# Patient Record
Sex: MALE | Race: WHITE | Employment: FULL TIME | URBAN - METROPOLITAN AREA
[De-identification: names, ages, dates, MRNs, and addresses within clinical notes are randomized per-mention and may not be internally consistent; named-entity substitution may affect disease eponyms.]

---

## 2017-09-06 ENCOUNTER — OFFICE VISIT (OUTPATIENT)
Dept: PEDIATRICS | Facility: CLINIC | Age: 57
End: 2017-09-06
Payer: COMMERCIAL

## 2017-09-06 VITALS
BODY MASS INDEX: 34.02 KG/M2 | OXYGEN SATURATION: 96 % | HEART RATE: 64 BPM | HEIGHT: 71 IN | DIASTOLIC BLOOD PRESSURE: 78 MMHG | WEIGHT: 243 LBS | SYSTOLIC BLOOD PRESSURE: 118 MMHG | TEMPERATURE: 97.6 F

## 2017-09-06 DIAGNOSIS — Z11.59 NEED FOR HEPATITIS C SCREENING TEST: ICD-10-CM

## 2017-09-06 DIAGNOSIS — I82.409 RECURRENT DEEP VENOUS THROMBOSIS, UNSPECIFIED LATERALITY (H): Primary | ICD-10-CM

## 2017-09-06 DIAGNOSIS — E78.5 HYPERLIPIDEMIA LDL GOAL <130: ICD-10-CM

## 2017-09-06 DIAGNOSIS — Z93.2 STATUS POST ILEOSTOMY (H): ICD-10-CM

## 2017-09-06 LAB
ALBUMIN SERPL-MCNC: 4.3 G/DL (ref 3.4–5)
ALP SERPL-CCNC: 57 U/L (ref 40–150)
ALT SERPL W P-5'-P-CCNC: 57 U/L (ref 0–70)
ANION GAP SERPL CALCULATED.3IONS-SCNC: 7 MMOL/L (ref 3–14)
AST SERPL W P-5'-P-CCNC: 33 U/L (ref 0–45)
BILIRUB SERPL-MCNC: 0.4 MG/DL (ref 0.2–1.3)
BUN SERPL-MCNC: 16 MG/DL (ref 7–30)
CALCIUM SERPL-MCNC: 9.1 MG/DL (ref 8.5–10.1)
CHLORIDE SERPL-SCNC: 110 MMOL/L (ref 94–109)
CHOLEST SERPL-MCNC: 181 MG/DL
CO2 SERPL-SCNC: 24 MMOL/L (ref 20–32)
CREAT SERPL-MCNC: 1.18 MG/DL (ref 0.66–1.25)
GFR SERPL CREATININE-BSD FRML MDRD: 63 ML/MIN/1.7M2
GLUCOSE SERPL-MCNC: 98 MG/DL (ref 70–99)
HDLC SERPL-MCNC: 55 MG/DL
LDLC SERPL CALC-MCNC: 109 MG/DL
NONHDLC SERPL-MCNC: 126 MG/DL
POTASSIUM SERPL-SCNC: 4.2 MMOL/L (ref 3.4–5.3)
PROT SERPL-MCNC: 7.1 G/DL (ref 6.8–8.8)
SODIUM SERPL-SCNC: 141 MMOL/L (ref 133–144)
TRIGL SERPL-MCNC: 86 MG/DL

## 2017-09-06 PROCEDURE — 80061 LIPID PANEL: CPT | Performed by: INTERNAL MEDICINE

## 2017-09-06 PROCEDURE — 36415 COLL VENOUS BLD VENIPUNCTURE: CPT | Performed by: INTERNAL MEDICINE

## 2017-09-06 PROCEDURE — 86803 HEPATITIS C AB TEST: CPT | Performed by: INTERNAL MEDICINE

## 2017-09-06 PROCEDURE — 80053 COMPREHEN METABOLIC PANEL: CPT | Performed by: INTERNAL MEDICINE

## 2017-09-06 PROCEDURE — 99214 OFFICE O/P EST MOD 30 MIN: CPT | Performed by: INTERNAL MEDICINE

## 2017-09-06 RX ORDER — SIMVASTATIN 20 MG
20 TABLET ORAL DAILY
Qty: 90 TABLET | Refills: 3 | Status: SHIPPED | OUTPATIENT
Start: 2017-09-06 | End: 2018-11-15

## 2017-09-06 NOTE — PROGRESS NOTES
SUBJECTIVE:                                                    Anam Wolf is a 57 year old male who presents to clinic today for the following health issues:    57-year-old male with a history of recurrent DVT presents today for follow-up.  Since most recent visit, patient has met with hematology.  Recent hematology notes reviewed: history of 3 DVTs in the past and has been on long-term anticoagulation.  today patient is interested in discontinuing anticoagulation as suggested by Hematology.      Hyperlipidemia.  Tolerating statin without side effects.  Recent Labs   Lab Test  09/06/17   0928  11/22/16   0953  11/16/15   1006  08/06/14   1023   CHOL  181  184  192  168   HDL  55  47  49  48   LDL  109*  103*  118  91   TRIG  86  171*  124  144   CHOLHDLRATIO   --    --   3.9  3.5     History of ileostomy following colectomy for ulcerative colitis.  Has had no issues with the stoma    Patient Active Problem List   Diagnosis     HYPERLIPIDEMIA LDL GOAL <130     Status post ileostomy (H)     Atopic dermatitis, unspecified type     Recurrent deep venous thrombosis, unspecified laterality (H)     Past Surgical History:   Procedure Laterality Date     C REMOVAL COLON/PROCTECTOMY/ILEOSTOMY  4/18/2006    Total proctocolectomy with intersphincteric proctectomy and end ileostomy     HC KNEE SCOPE,MED/LAT MENISECTOMY  12/10/2004    Right knee, partial medial meniscectomy and chondroplasty medial femoral condyle     HC REMOVAL OF TONSILS,<11 Y/O       HC REPAIR ING HERNIA,5+Y/O,REDUCIBL         Social History   Substance Use Topics     Smoking status: Never Smoker     Smokeless tobacco: Never Used     Alcohol use 0.0 oz/week     0 Standard drinks or equivalent per week      Comment: rare     Family History   Problem Relation Age of Onset     Lipids Father      DIABETES Brother      type 2     Lipids Brother      C.A.D. Father      MI related to surgery     Cancer - colorectal Father      diagnosis age 56     Prostate  "Cancer No family hx of      Blood Disease No family hx of      denies family history of venous thromboembolic dz         Current Outpatient Prescriptions   Medication Sig Dispense Refill     simvastatin (ZOCOR) 20 MG tablet Take 1 tablet (20 mg) by mouth daily 90 tablet 3     triamcinolone (KENALOG) 0.1 % cream APPLY TO RASH TID PRN 60 g 3     [DISCONTINUED] simvastatin (ZOCOR) 20 MG tablet Take 1 tablet (20 mg) by mouth daily 90 tablet 3       ROS: The following systems have been completely reviewed and are negative except as noted in the HPI: CONSTITUTIONAL, GASTROINTESTINAL, PULMONARY    OBJECTIVE:                                                    /78 (Cuff Size: Adult Large)  Pulse 64  Temp 97.6  F (36.4  C) (Oral)  Ht 5' 11\" (1.803 m)  Wt 243 lb (110.2 kg)  SpO2 96%  BMI 33.89 kg/m2 Body mass index is 33.89 kg/(m^2).  GENERAL:  alert,  no distress  NECK: no tenderness, no adenopathy  RESP: lungs clear to auscultation - no rales, no rhonchi, no wheezes  CV: regular rates and rhythm, normal S1 S2  Abd: soft, NT  MS: extremities- no edema       ASSESSMENT/PLAN:                                                        ICD-10-CM    1. Recurrent deep venous thrombosis, unspecified laterality (H) I82.409 Plan for trial off anticoagulation as per hematology.  Hematology also recommends ASA 81mg long-term.      Signs and symptoms of DVT and PE reviewed.  Patient understands that he will require lifelong anticoagulation if any further clots develop     2. Hyperlipidemia LDL goal <130 E78.5 Lipid panel reflex to direct LDL     Comprehensive metabolic panel (BMP + Alb, Alk Phos, ALT, AST, Total. Bili, TP)     simvastatin (ZOCOR) 20 MG tablet       Well controlled.  Fasting for labwork     3. Status post ileostomy (H) Z93.2 No problems with ostomy or output     4. Need for hepatitis C screening test Z11.59 Hepatitis C antibody      Villa Garland MD  [facility] [place]     "

## 2017-09-06 NOTE — MR AVS SNAPSHOT
After Visit Summary   9/6/2017    Anam Wolf    MRN: [id number]           Patient Information     Date Of Birth          1960        Visit Information        Provider Department      9/6/2017 9:00 AM Villa Garland MD [facility]        Today's Diagnoses     Recurrent deep venous thrombosis, unspecified laterality (H)    -  1    Hyperlipidemia LDL goal <130        Status post ileostomy (H)        Need for hepatitis C screening test          Care Instructions    INSTRUCTIONS FOR TODAY:     stop Xarelto, start aspirin 81mg daily tomorrow     Dr Garland            Follow-ups after your visit        Who to contact     If you have questions or need follow up information about today's clinic visit or your schedule please contact [facility] directly at [phone].  Normal or non-critical lab and imaging results will be communicated to you by [employer], letter or phone within 4 business days after the clinic has received the results. If you do not hear from us within 7 days, please contact the clinic through [employer] or phone. If you have a critical or abnormal lab result, we will notify you by phone as soon as possible.  Submit refill requests through [employer] or call your pharmacy and they will forward the refill request to us. Please allow 3 business days for your refill to be completed.          Additional Information About Your Visit        MyChart Information     [employer] gives you secure access to your electronic health record. If you see a primary care provider, you can also send messages to your care team and make appointments. If you have questions, please call your primary care clinic.  If you do not have a primary care provider, please call [phone] and they will assist you.        Care EveryWhere ID     This is your Care EveryWhere ID. This could be used by other organizations to access your [place] medical records  [id number]        Your Vitals Were      "Pulse Temperature Height Pulse Oximetry BMI (Body Mass Index)       64 97.6  F (36.4  C) (Oral) 5' 11\" (1.803 m) 96% 33.89 kg/m2        Blood Pressure from Last 3 Encounters:   09/06/17 118/78   12/13/16 140/78   11/22/16 128/78    Weight from Last 3 Encounters:   09/06/17 243 lb (110.2 kg)   12/13/16 242 lb (109.8 kg)   11/22/16 241 lb (109.3 kg)              We Performed the Following     Comprehensive metabolic panel (BMP + Alb, Alk Phos, ALT, AST, Total. Bili, TP)     Hepatitis C antibody     Lipid panel reflex to direct LDL          Today's Medication Changes          These changes are accurate as of: 9/6/17  9:25 AM.  If you have any questions, ask your nurse or doctor.               Stop taking these medicines if you haven't already. Please contact your care team if you have questions.     rivaroxaban ANTICOAGULANT 20 MG Tabs tablet   Commonly known as:  XARELTO   Stopped by:  Villa Garland MD                Where to get your medicines      [id number]  [place] - [address] AT [facility]  [address] [address]     Phone:  [phone]     simvastatin 20 MG tablet                Primary Care Provider Office Phone # Fax #    Villa Garland [phone] [address] [place] [address]        Equal Access to Services     [facility] AH: Hadii marta ku hadasho Soomaali, waaxda luqadaha, qaybta kaalmada almaegyacoretta, waxay tacso zuniga . So [facility] [phone].    ATENCIÓN: Si habla octavio, tiene a morin disposición servicios gratuitos de asistencia lingüística. Llame al [phone].    We comply with applicable federal civil rights laws and [place] laws. We do not discriminate on the basis of race, color, national origin, age, disability sex, sexual orientation or gender identity.            Thank you!     Thank you for choosing [facility] [place]  for your care. Our goal " is always to provide you with excellent care. Hearing back from our patients is one way we can continue to improve our services. Please take a few minutes to complete the written survey that you may receive in the mail after your visit with us. Thank you!             Your Updated Medication List - Protect others around you: Learn how to safely use, store and throw away your medicines at www.disposemymeds.org.          This list is accurate as of: 9/6/17  9:25 AM.  Always use your most recent med list.                   Brand Name Dispense Instructions for use Diagnosis    simvastatin 20 MG tablet    ZOCOR    90 tablet    Take 1 tablet (20 mg) by mouth daily    Hyperlipidemia LDL goal <130       triamcinolone 0.1 % cream    KENALOG    60 g    APPLY TO RASH TID PRN    Atopic dermatitis, unspecified type

## 2017-09-06 NOTE — NURSING NOTE
"Chief Complaint   Patient presents with     Recheck Medication       Initial /78 (Cuff Size: Adult Large)  Pulse 64  Temp 97.6  F (36.4  C) (Oral)  Ht 5' 11\" (1.803 m)  Wt 243 lb (110.2 kg)  SpO2 96%  BMI 33.89 kg/m2 Estimated body mass index is 33.89 kg/(m^2) as calculated from the following:    Height as of this encounter: 5' 11\" (1.803 m).    Weight as of this encounter: 243 lb (110.2 kg).  Medication Reconciliation: jennifer Mancuso, LIN    "

## 2017-09-07 LAB — HCV AB SERPL QL IA: NONREACTIVE

## 2018-02-14 ENCOUNTER — OFFICE VISIT (OUTPATIENT)
Dept: PEDIATRICS | Facility: CLINIC | Age: 58
End: 2018-02-14
Payer: COMMERCIAL

## 2018-02-14 VITALS
SYSTOLIC BLOOD PRESSURE: 146 MMHG | DIASTOLIC BLOOD PRESSURE: 82 MMHG | OXYGEN SATURATION: 96 % | BODY MASS INDEX: 34.69 KG/M2 | TEMPERATURE: 98.5 F | HEART RATE: 91 BPM | WEIGHT: 247.8 LBS | HEIGHT: 71 IN

## 2018-02-14 DIAGNOSIS — J20.9 ACUTE BRONCHITIS, UNSPECIFIED ORGANISM: Primary | ICD-10-CM

## 2018-02-14 DIAGNOSIS — J11.1 INFLUENZA: ICD-10-CM

## 2018-02-14 DIAGNOSIS — R06.2 WHEEZING: ICD-10-CM

## 2018-02-14 PROCEDURE — 94640 AIRWAY INHALATION TREATMENT: CPT | Performed by: PHYSICIAN ASSISTANT

## 2018-02-14 PROCEDURE — 99214 OFFICE O/P EST MOD 30 MIN: CPT | Mod: 25 | Performed by: PHYSICIAN ASSISTANT

## 2018-02-14 RX ORDER — ALBUTEROL SULFATE 90 UG/1
2 AEROSOL, METERED RESPIRATORY (INHALATION) EVERY 6 HOURS PRN
Qty: 1 INHALER | Refills: 0 | Status: SHIPPED | OUTPATIENT
Start: 2018-02-14 | End: 2018-12-11

## 2018-02-14 RX ORDER — CODEINE PHOSPHATE AND GUAIFENESIN 10; 100 MG/5ML; MG/5ML
1 SOLUTION ORAL
Qty: 60 ML | Refills: 0 | Status: SHIPPED | OUTPATIENT
Start: 2018-02-14 | End: 2018-12-11

## 2018-02-14 RX ORDER — AZITHROMYCIN 250 MG/1
TABLET, FILM COATED ORAL
Qty: 6 TABLET | Refills: 0 | Status: SHIPPED | OUTPATIENT
Start: 2018-02-14 | End: 2018-12-11

## 2018-02-14 NOTE — MR AVS SNAPSHOT
After Visit Summary   2/14/2018    Anam Wolf    MRN: [id number]           Patient Information     Date Of Birth          1960        Visit Information        Provider Department      2/14/2018 12:50 PM Errol Mckeon PA-C [facility]        Today's Diagnoses     Acute bronchitis, unspecified organism    -  1      Care Instructions                       Acute Bronchitis                  What is acute bronchitis?   Bronchitis is an infection of the air passages--that is, the tubes that connect the windpipe to the lungs. It causes swelling and irritation of the airways. With acute bronchitis you usually have a cough that produces phlegm and pain behind the breastbone when you breathe deeply or cough.   How does it occur?   Bronchitis often occurs with viral infections of the respiratory tract, such as colds and flu. Bronchitis may also be caused by bacterial infections. It may occur with childhood illnesses such as measles and whooping cough.   Attacks are most frequent during the winter or when the level of air pollution is high.   Infants, young children, older adults, smokers, and people with heart disease or lung disease (including asthma and allergies) are most likely to get acute bronchitis.   What are the symptoms?   Symptoms may include:   a deep cough that produces yellowish or greenish phlegm   pain behind the breastbone when you breathe deeply or cough   wheezing   feeling short of breath   fever   chills   headache   sore muscles.   How is it diagnosed?   Your healthcare provider will ask about your symptoms and examine you. You may have tests, such as:   a test of phlegm to look for bacteria   chest X-ray   blood tests.   How is it treated?   Acute bronchitis often does not require medical treatment. Resting at home and drinking plenty of fluids to keep the mucus loose may be all you need to do to get better in a few days. If your symptoms are severe or you  have other health problems (such as heart or lung disease or diabetes), you may need to take antibiotics.   How long will the effects last?   Most of the time acute bronchitis clears up in a few days. Your cough may slowly get better in 1 to 2 weeks.   It may take you longer to recover if:   You are a smoker.   You live in an area where air pollution is a problem.   You have a heart or lung disease.   You have any other continuing health problems.   How can I take care of myself?   You can help yourself by:   following the full treatment your healthcare provider recommends   using a vaporizer, humidifier, or steam from hot water to add moisture to the air   drinking plenty of liquids   taking cough medicine if recommended by your healthcare provider   resting in bed   taking aspirin or acetaminophen to reduce fever and relieve headache and muscle pain (Check with your healthcare provider before you give any medicine that contains aspirin or salicylates to a child or teen. This includes medicines like baby aspirin, some cold medicines, and Pepto Bismol. Children and teens who take aspirin are at risk for a serious illness called Reye's syndrome.)   eating healthy meals.   Call your healthcare provider if:   You have trouble breathing.   You have a fever of 101.5?F (38.6?C) or higher.   You cough up blood.   Your symptoms are getting worse instead of better.   You don't start to feel better after 3 days of treatment.   You have any symptoms that concern you.   How can I help prevent acute bronchitis?   To reduce your risk of getting a respiratory infection:   Do not smoke.   Wash your hands often, especially when you are around people with colds (upper respiratory infections).   If you have asthma or allergies, keep your symptoms under good control.   Get regular exercise.   Eat healthy foods.       Published by [employer].  This content is reviewed periodically and is subject to change as new health information  "becomes available. The information is intended to inform and educate and is not a replacement for medical evaluation, advice, diagnosis or treatment by a healthcare professional.   Developed by [employer].   ? 2010 [employer] and/or its affiliates. All Rights Reserved.   Copyright   [employer] 2011                  Follow-ups after your visit        Who to contact     If you have questions or need follow up information about today's clinic visit or your schedule please contact [facility] directly at [phone].  Normal or non-critical lab and imaging results will be communicated to you by [employer], letter or phone within 4 business days after the clinic has received the results. If you do not hear from us within 7 days, please contact the clinic through [employer] or phone. If you have a critical or abnormal lab result, we will notify you by phone as soon as possible.  Submit refill requests through [employer] or call your pharmacy and they will forward the refill request to us. Please allow 3 business days for your refill to be completed.          Additional Information About Your Visit        [employer] Information     [employer] gives you secure access to your electronic health record. If you see a primary care provider, you can also send messages to your care team and make appointments. If you have questions, please call your primary care clinic.  If you do not have a primary care provider, please call [phone] and they will assist you.        Care EveryWhere ID     This is your Care EveryWhere ID. This could be used by other organizations to access your [place] medical records  [id number]        Your Vitals Were     Pulse Temperature Height Pulse Oximetry BMI (Body Mass Index)       91 98.5  F (36.9  C) (Oral) 5' 11\" (1.803 m) 96% 34.56 kg/m2        Blood Pressure from Last 3 Encounters:   02/14/18 146/82   09/06/17 118/78   12/13/16 140/78    Weight from Last 3 Encounters:   02/14/18 " 247 lb 12.8 oz (112.4 kg)   09/06/17 243 lb (110.2 kg)   12/13/16 242 lb (109.8 kg)              Today, you had the following     No orders found for display         Today's Medication Changes          These changes are accurate as of 2/14/18  1:33 PM.  If you have any questions, ask your nurse or doctor.               Start taking these medicines.        Dose/Directions    albuterol 108 (90 BASE) MCG/ACT Inhaler   Commonly known as:  PROAIR HFA/PROVENTIL HFA/VENTOLIN HFA   Used for:  Acute bronchitis, unspecified organism   Started by:  Errol Mckeon PA-C        Dose:  2 puff   Inhale 2 puffs into the lungs every 6 hours as needed for shortness of breath / dyspnea or wheezing   Quantity:  1 Inhaler   Refills:  0       azithromycin 250 MG tablet   Commonly known as:  ZITHROMAX   Used for:  Acute bronchitis, unspecified organism   Started by:  Errol Mckeon PA-C        Two tablets first day, then one tablet daily for four days.   Quantity:  6 tablet   Refills:  0       guaiFENesin-codeine 100-10 MG/5ML Soln solution   Commonly known as:  ROBITUSSIN AC   Used for:  Acute bronchitis, unspecified organism   Started by:  Errol Mckeon PA-C        Dose:  1 tsp.   Take 5 mLs by mouth nightly as needed for cough   Quantity:  60 mL   Refills:  0            Where to get your medicines      These medications were sent to [id number]  [place] - [address] AT [facility]  [address] [place] [address]     Phone:  [phone]     albuterol 108 (90 BASE) MCG/ACT Inhaler    azithromycin 250 MG tablet         Some of these will need a paper prescription and others can be bought over the counter.  Ask your nurse if you have questions.     Bring a paper prescription for each of these medications     guaiFENesin-codeine 100-10 MG/5ML Soln solution                Primary Care Provider Office Phone # Fax #    Villa Garland [phone]        [address] [place] [address]        Equal Access to Services     [facility] : Hadii marta dueñas antoniadana Deangeloali, wakurtisda rolandkamiha, qaybta kajose miguelcoretta guzman, eamon meadowsalejoyefri zuniga . So [facility] [phone].    ATENCIÓN: Si habla español, tiene a morin disposición servicios gratuitos de asistencia lingüística. LlFairfield Medical Center [phone].    We comply with applicable federal civil rights laws and [place] laws. We do not discriminate on the basis of race, color, national origin, age, disability, sex, sexual orientation, or gender identity.            Thank you!     Thank you for choosing [facility] [place]  for your care. Our goal is always to provide you with excellent care. Hearing back from our patients is one way we can continue to improve our services. Please take a few minutes to complete the written survey that you may receive in the mail after your visit with us. Thank you!             Your Updated Medication List - Protect others around you: Learn how to safely use, store and throw away your medicines at www.disposemymeds.org.          This list is accurate as of 2/14/18  1:33 PM.  Always use your most recent med list.                   Brand Name Dispense Instructions for use Diagnosis    albuterol 108 (90 BASE) MCG/ACT Inhaler    PROAIR HFA/PROVENTIL HFA/VENTOLIN HFA    1 Inhaler    Inhale 2 puffs into the lungs every 6 hours as needed for shortness of breath / dyspnea or wheezing    Acute bronchitis, unspecified organism       azithromycin 250 MG tablet    ZITHROMAX    6 tablet    Two tablets first day, then one tablet daily for four days.    Acute bronchitis, unspecified organism       guaiFENesin-codeine 100-10 MG/5ML Soln solution    ROBITUSSIN AC    60 mL    Take 5 mLs by mouth nightly as needed for cough    Acute bronchitis, unspecified organism       simvastatin 20 MG tablet    ZOCOR    90 tablet    Take 1 tablet (20 mg) by mouth daily    Hyperlipidemia LDL goal <130        triamcinolone 0.1 % cream    KENALOG    60 g    APPLY TO RASH TID PRN    Atopic dermatitis, unspecified type

## 2018-02-14 NOTE — PROGRESS NOTES
"  SUBJECTIVE:   Anam Wolf is a 57 year old male who presents to clinic today for the following health issues:    Acute Illness   Acute illness concerns: cough  Onset: 6 days    Fever: YES--subjective    Chills/Sweats: YES- both    Headache (location?): YES    Sinus Pressure:no    Conjunctivitis:  no    Ear Pain: no    Rhinorrhea: YES- clear/green    Congestion: YES- nasal and chest    Sore Throat: YES     Cough: YES-productive of clear sputum, productive of green sputum    Wheeze: YES    Sob: no    Chest tightness: yes    Decreased Appetite: YES    Nausea: no    Vomiting: no    Diarrhea:  no    Dysuria/Freq.: no    Fatigue/Achiness: YES- both    Sick/Strep Exposure: no     Therapies Tried and outcome: Tylenol, Ibuprofen   History of \"asthma\" issues. History of bronchitis.   Nonsmoker.   Work: office setting  No flu shot  Saturday: leaving for [employer]    ROS:  ROS otherwise negative    OBJECTIVE:                                                    /82 (BP Location: Right arm, Cuff Size: Adult Large)  Pulse 91  Temp 98.5  F (36.9  C) (Oral)  Ht 5' 11\" (1.803 m)  Wt 247 lb 12.8 oz (112.4 kg)  SpO2 96%  BMI 34.56 kg/m2  Body mass index is 34.56 kg/(m^2).   GENERAL: alert, no distress  HENT: ear canals- normal; TMs- normal; Nose- normal; Mouth- no ulcers, no lesions  NECK: no tenderness, no adenopathy  RESP: diminished breath sounds throughout; rhonchi and wheezing present  CV: regular rates and rhythm, normal S1 S2, no S3 or S4 and no murmur, no click or rub    Diagnostic test results:  duoneb given with resolution of wheezing and rhonchi  No results found for this or any previous visit (from the past 24 hour(s)).     ASSESSMENT/PLAN:                                                    (J20.9) Acute bronchitis, unspecified organism  (primary encounter diagnosis)  Comment: begin antibiotics and albuterol four times daily.   Plan: azithromycin (ZITHROMAX) 250 MG tablet,         albuterol (PROAIR " HFA/PROVENTIL HFA/VENTOLIN         HFA) 108 (90 BASE) MCG/ACT Inhaler,         guaiFENesin-codeine (ROBITUSSIN AC) 100-10         MG/5ML SOLN solution            (R06.2) Wheezing  Comment:   Plan: ALBUTEROL/IPRATROPIUM 3ML NEB,         INHALATION/NEBULIZER TREATMENT, INITIAL           (J11.1) Influenza  Comment: continue with symptomatic treatment.   Plan:       See Patient Instructions    Errol Mckeon PA-C  [facility]

## 2018-02-14 NOTE — PATIENT INSTRUCTIONS
Acute Bronchitis                  What is acute bronchitis?   Bronchitis is an infection of the air passages--that is, the tubes that connect the windpipe to the lungs. It causes swelling and irritation of the airways. With acute bronchitis you usually have a cough that produces phlegm and pain behind the breastbone when you breathe deeply or cough.   How does it occur?   Bronchitis often occurs with viral infections of the respiratory tract, such as colds and flu. Bronchitis may also be caused by bacterial infections. It may occur with childhood illnesses such as measles and whooping cough.   Attacks are most frequent during the winter or when the level of air pollution is high.   Infants, young children, older adults, smokers, and people with heart disease or lung disease (including asthma and allergies) are most likely to get acute bronchitis.   What are the symptoms?   Symptoms may include:   a deep cough that produces yellowish or greenish phlegm   pain behind the breastbone when you breathe deeply or cough   wheezing   feeling short of breath   fever   chills   headache   sore muscles.   How is it diagnosed?   Your healthcare provider will ask about your symptoms and examine you. You may have tests, such as:   a test of phlegm to look for bacteria   chest X-ray   blood tests.   How is it treated?   Acute bronchitis often does not require medical treatment. Resting at home and drinking plenty of fluids to keep the mucus loose may be all you need to do to get better in a few days. If your symptoms are severe or you have other health problems (such as heart or lung disease or diabetes), you may need to take antibiotics.   How long will the effects last?   Most of the time acute bronchitis clears up in a few days. Your cough may slowly get better in 1 to 2 weeks.   It may take you longer to recover if:   You are a smoker.   You live in an area where air pollution is a problem.   You have a heart  or lung disease.   You have any other continuing health problems.   How can I take care of myself?   You can help yourself by:   following the full treatment your healthcare provider recommends   using a vaporizer, humidifier, or steam from hot water to add moisture to the air   drinking plenty of liquids   taking cough medicine if recommended by your healthcare provider   resting in bed   taking aspirin or acetaminophen to reduce fever and relieve headache and muscle pain (Check with your healthcare provider before you give any medicine that contains aspirin or salicylates to a child or teen. This includes medicines like baby aspirin, some cold medicines, and Pepto Bismol. Children and teens who take aspirin are at risk for a serious illness called Reye's syndrome.)   eating healthy meals.   Call your healthcare provider if:   You have trouble breathing.   You have a fever of 101.5?F (38.6?C) or higher.   You cough up blood.   Your symptoms are getting worse instead of better.   You don't start to feel better after 3 days of treatment.   You have any symptoms that concern you.   How can I help prevent acute bronchitis?   To reduce your risk of getting a respiratory infection:   Do not smoke.   Wash your hands often, especially when you are around people with colds (upper respiratory infections).   If you have asthma or allergies, keep your symptoms under good control.   Get regular exercise.   Eat healthy foods.       Published by [employer].  This content is reviewed periodically and is subject to change as new health information becomes available. The information is intended to inform and educate and is not a replacement for medical evaluation, advice, diagnosis or treatment by a healthcare professional.   Developed by [employer].   ? 2010 [employer] and/or its affiliates. All Rights Reserved.   Copyright   [employer] 2011

## 2018-02-14 NOTE — NURSING NOTE
"Chief Complaint   Patient presents with     Cough       Initial /82 (BP Location: Right arm, Cuff Size: Adult Large)  Pulse 91  Temp 98.5  F (36.9  C) (Oral)  Ht 5' 11\" (1.803 m)  Wt 247 lb 12.8 oz (112.4 kg)  SpO2 96%  BMI 34.56 kg/m2 Estimated body mass index is 34.56 kg/(m^2) as calculated from the following:    Height as of this encounter: 5' 11\" (1.803 m).    Weight as of this encounter: 247 lb 12.8 oz (112.4 kg).  Medication Reconciliation: complete   Don Purvis CMA      "

## 2018-11-15 DIAGNOSIS — E78.5 HYPERLIPIDEMIA LDL GOAL <130: ICD-10-CM

## 2018-11-19 RX ORDER — SIMVASTATIN 20 MG
20 TABLET ORAL DAILY
Qty: 30 TABLET | Refills: 0 | Status: SHIPPED | OUTPATIENT
Start: 2018-11-19 | End: 2018-12-11

## 2018-11-19 NOTE — TELEPHONE ENCOUNTER
Medication is being filled for 1 time refill only due to:  Pt over due for office visit.     Tati Murray RN

## 2018-12-11 ENCOUNTER — OFFICE VISIT (OUTPATIENT)
Dept: PEDIATRICS | Facility: CLINIC | Age: 58
End: 2018-12-11
Payer: COMMERCIAL

## 2018-12-11 VITALS
HEART RATE: 62 BPM | BODY MASS INDEX: 33.19 KG/M2 | WEIGHT: 238 LBS | SYSTOLIC BLOOD PRESSURE: 122 MMHG | TEMPERATURE: 97.8 F | DIASTOLIC BLOOD PRESSURE: 80 MMHG

## 2018-12-11 DIAGNOSIS — E78.5 HYPERLIPIDEMIA LDL GOAL <130: ICD-10-CM

## 2018-12-11 DIAGNOSIS — Z12.5 SCREENING FOR PROSTATE CANCER: ICD-10-CM

## 2018-12-11 DIAGNOSIS — Z00.00 ENCOUNTER FOR ROUTINE ADULT HEALTH EXAMINATION WITHOUT ABNORMAL FINDINGS: Primary | ICD-10-CM

## 2018-12-11 DIAGNOSIS — Z93.2 STATUS POST ILEOSTOMY (H): ICD-10-CM

## 2018-12-11 LAB
ALBUMIN SERPL-MCNC: 4.2 G/DL (ref 3.4–5)
ALP SERPL-CCNC: 52 U/L (ref 40–150)
ALT SERPL W P-5'-P-CCNC: 52 U/L (ref 0–70)
ANION GAP SERPL CALCULATED.3IONS-SCNC: 8 MMOL/L (ref 3–14)
AST SERPL W P-5'-P-CCNC: 36 U/L (ref 0–45)
BILIRUB SERPL-MCNC: 0.8 MG/DL (ref 0.2–1.3)
BUN SERPL-MCNC: 12 MG/DL (ref 7–30)
CALCIUM SERPL-MCNC: 9.4 MG/DL (ref 8.5–10.1)
CHLORIDE SERPL-SCNC: 108 MMOL/L (ref 94–109)
CHOLEST SERPL-MCNC: 147 MG/DL
CO2 SERPL-SCNC: 25 MMOL/L (ref 20–32)
CREAT SERPL-MCNC: 1.23 MG/DL (ref 0.66–1.25)
GFR SERPL CREATININE-BSD FRML MDRD: 60 ML/MIN/1.7M2
GLUCOSE SERPL-MCNC: 97 MG/DL (ref 70–99)
HDLC SERPL-MCNC: 44 MG/DL
LDLC SERPL CALC-MCNC: 82 MG/DL
NONHDLC SERPL-MCNC: 103 MG/DL
POTASSIUM SERPL-SCNC: 4.1 MMOL/L (ref 3.4–5.3)
PROT SERPL-MCNC: 7 G/DL (ref 6.8–8.8)
PSA SERPL-ACNC: 0.28 UG/L (ref 0–4)
SODIUM SERPL-SCNC: 141 MMOL/L (ref 133–144)
TRIGL SERPL-MCNC: 104 MG/DL

## 2018-12-11 PROCEDURE — 99396 PREV VISIT EST AGE 40-64: CPT | Performed by: INTERNAL MEDICINE

## 2018-12-11 PROCEDURE — 80061 LIPID PANEL: CPT | Performed by: INTERNAL MEDICINE

## 2018-12-11 PROCEDURE — 36415 COLL VENOUS BLD VENIPUNCTURE: CPT | Performed by: INTERNAL MEDICINE

## 2018-12-11 PROCEDURE — 80053 COMPREHEN METABOLIC PANEL: CPT | Performed by: INTERNAL MEDICINE

## 2018-12-11 PROCEDURE — G0103 PSA SCREENING: HCPCS | Performed by: INTERNAL MEDICINE

## 2018-12-11 RX ORDER — SIMVASTATIN 20 MG
20 TABLET ORAL DAILY
Qty: 90 TABLET | Refills: 3 | Status: SHIPPED | OUTPATIENT
Start: 2018-12-11 | End: 2019-12-06

## 2018-12-11 ASSESSMENT — ENCOUNTER SYMPTOMS
COUGH: 0
EYE PAIN: 0
DIARRHEA: 0
WEAKNESS: 0
DIZZINESS: 0
FEVER: 0
MYALGIAS: 0
ARTHRALGIAS: 0
HEMATURIA: 0
SHORTNESS OF BREATH: 0
HEMATOCHEZIA: 0
NERVOUS/ANXIOUS: 0
CONSTIPATION: 0
CHILLS: 0
SORE THROAT: 0
HEADACHES: 0
NAUSEA: 0
ABDOMINAL PAIN: 0
PARESTHESIAS: 0
HEARTBURN: 0
PALPITATIONS: 0
FREQUENCY: 0
JOINT SWELLING: 0
DYSURIA: 0

## 2018-12-11 NOTE — PROGRESS NOTES
SUBJECTIVE:   CC: Anam Wolf is an 58 year old male who presents for preventative health visit.     Physical   Annual:     Getting at least 3 servings of Calcium per day:  NO    Bi-annual eye exam:  NO    Dental care twice a year:  Yes    Sleep apnea or symptoms of sleep apnea:  None    Diet:  Regular (no restrictions)    Frequency of exercise:  2-3 days/week    Duration of exercise:  Less than 15 minutes    Taking medications regularly:  Yes    Medication side effects:  Not applicable    Additional concerns today:  No    PHQ-2 Total Score: 0      Today's PHQ-2 Score:   PHQ-2 ( 1999 [employer]) 12/11/2018   Q1: Little interest or pleasure in doing things 0   Q2: Feeling down, depressed or hopeless 0   PHQ-2 Score 0   Q1: Little interest or pleasure in doing things Not at all   Q2: Feeling down, depressed or hopeless Not at all   PHQ-2 Score 0       Abuse: Current or Past(Physical, Sexual or Emotional)- No  Do you feel safe in your environment? Yes    Social History     Tobacco Use     Smoking status: Never Smoker     Smokeless tobacco: Never Used   Substance Use Topics     Alcohol use: Yes     Alcohol/week: 0.0 oz     Comment: rare     Alcohol Use 12/11/2018   If you drink alcohol do you typically have greater than 3 drinks per day OR greater than 7 drinks per week? No   No flowsheet data found.    Last PSA:   PSA   Date Value Ref Range Status   11/22/2016 0.36 0 - 4 ug/L Final     Comment:     Assay Method:  Chemiluminescence using Siemens Vista analyzer       Reviewed orders with patient. Reviewed health maintenance and updated orders accordingly - Yes      Reviewed and updated as needed this visit by clinical staff  Tobacco  Allergies  Meds         Reviewed and updated as needed this visit by Provider          Patient Active Problem List   Diagnosis     HYPERLIPIDEMIA LDL GOAL <130     Status post ileostomy (H)     Atopic dermatitis, unspecified type     Recurrent deep venous thrombosis, unspecified  laterality (H)     Past Medical History:   Diagnosis Date     Allergic rhinitis, cause unspecified     ragweed     DVT (deep venous thrombosis) (H)      Iatrogenic pulmonary embolism and infarction (H)     right lower extrem DVT with right PE, followed surgery for right knee     Left sided ulcerative (chronic) colitis (H)     initial presentation 2002 with fairly distal dz; flare beginning late 2005, hospitalized 3/2006; CT at that time showing bowel-wall thickening extending from transverse colon distal; refractory to med management, required colectomy 4/2006     Mild intermittent asthma 3/11/2005    prior diagnosis of mod persistent; currently off controller without persistent symptoms       Past Surgical History:   Procedure Laterality Date     C REMOVAL COLON/PROCTECTOMY/ILEOSTOMY  4/18/2006    Total proctocolectomy with intersphincteric proctectomy and end ileostomy     HC KNEE SCOPE,MED/LAT MENISECTOMY  12/10/2004    Right knee, partial medial meniscectomy and chondroplasty medial femoral condyle     HC REMOVAL OF TONSILS,<13 Y/O       HC REPAIR ING HERNIA,5+Y/O,REDUCIBL         Family History   Problem Relation Age of Onset     Lipids Father      C.A.D. Father         MI related to surgery     Cancer - colorectal Father         diagnosis age 56     Diabetes Brother         type 2     Lipids Brother      Prostate Cancer No family hx of      Blood Disease No family hx of         denies family history of venous thromboembolic dz       ALLERGIES     Allergies   Allergen Reactions     Sulfa Drugs      rash, swelling       Review of Systems   Constitutional: Negative for chills and fever.   HENT: Negative for congestion, ear pain, hearing loss and sore throat.    Eyes: Negative for pain and visual disturbance.   Respiratory: Negative for cough and shortness of breath.    Cardiovascular: Negative for chest pain, palpitations and peripheral edema.   Gastrointestinal: Negative for abdominal pain, constipation,  diarrhea, heartburn, hematochezia and nausea.   Genitourinary: Negative for discharge, dysuria, frequency, genital sores, hematuria, impotence and urgency.   Musculoskeletal: Negative for arthralgias, joint swelling and myalgias.   Skin: Negative for rash.   Neurological: Negative for dizziness, weakness, headaches and paresthesias.   Psychiatric/Behavioral: Negative for mood changes. The patient is not nervous/anxious.          OBJECTIVE:   /80 (Cuff Size: Adult Large)   Pulse 62   Temp 97.8  F (36.6  C) (Oral)   Wt 108 kg (238 lb)   BMI 33.19 kg/m      Physical Exam  GENERAL: healthy, alert and no distress  EYES: Eyes grossly normal to inspection, PERRL and conjunctivae and sclerae normal  HENT: ear canals and TM's normal, nose and mouth without ulcers or lesions  NECK: no adenopathy, no asymmetry, masses, or scars and thyroid normal to palpation  RESP: lungs clear to auscultation - no rales, rhonchi or wheezes  CV: regular rate and rhythm, normal S1 S2, no S3 or S4, no murmur, click or rub, no peripheral edema and peripheral pulses strong  ABDOMEN: soft, nontender, no hepatosplenomegaly, no masses and bowel sounds normal.  RLQ ostomy  MS: no gross musculoskeletal defects noted, no edema  SKIN: no suspicious lesions or rashes  NEURO: Normal strength and tone, mentation intact and speech normal  PSYCH: mentation appears normal, affect normal/bright    ASSESSMENT/PLAN:       ICD-10-CM    1. Encounter for routine adult health examination without abnormal findings Z00.00        2. Hyperlipidemia LDL goal <130 E78.5 Lipid panel reflex to direct LDL Fasting     Comprehensive metabolic panel (BMP + Alb, Alk Phos, ALT, AST, Total. Bili, TP)     simvastatin (ZOCOR) 20 MG tablet     3. Status post ileostomy (H) Z93.2 Prior colectomy due to UC, no problems with ostomy     4. Screening for prostate cancer Z12.5 PSA, screen       COUNSELING:   Reviewed preventive health counseling, as reflected in patient  "instructions       Regular exercise       Healthy diet/nutrition       Prostate cancer screening    BP Readings from Last 1 Encounters:   12/11/18 122/80     Estimated body mass index is 33.19 kg/m  as calculated from the following:    Height as of 2/14/18: 1.803 m (5' 11\").    Weight as of this encounter: 108 kg (238 lb).      Weight management plan: Discussed healthy diet and exercise guidelines     reports that  has never smoked. he has never used smokeless tobacco.      Counseling Resources:  ATP IV Guidelines  Pooled Cohorts Equation Calculator  FRAX Risk Assessment  ICSI Preventive Guidelines  Dietary Guidelines for Americans, 2010  USDA's MyPlate  ASA Prophylaxis  Lung CA Screening    Villa Garland MD, MD  [facility]  "

## 2019-05-07 ENCOUNTER — TELEPHONE (OUTPATIENT)
Dept: PEDIATRICS | Facility: CLINIC | Age: 59
End: 2019-05-07

## 2019-05-07 ENCOUNTER — OFFICE VISIT (OUTPATIENT)
Dept: URGENT CARE | Facility: URGENT CARE | Age: 59
End: 2019-05-07
Payer: COMMERCIAL

## 2019-05-07 VITALS
DIASTOLIC BLOOD PRESSURE: 78 MMHG | TEMPERATURE: 97.3 F | SYSTOLIC BLOOD PRESSURE: 124 MMHG | BODY MASS INDEX: 32.78 KG/M2 | HEART RATE: 75 BPM | WEIGHT: 235 LBS | RESPIRATION RATE: 16 BRPM | OXYGEN SATURATION: 96 %

## 2019-05-07 DIAGNOSIS — R43.8 PRIMARY BITTER TASTE DISORDER: ICD-10-CM

## 2019-05-07 DIAGNOSIS — R43.8 METALLIC TASTE: Primary | ICD-10-CM

## 2019-05-07 PROCEDURE — 99214 OFFICE O/P EST MOD 30 MIN: CPT | Performed by: PHYSICIAN ASSISTANT

## 2019-05-07 RX ORDER — METHYLPREDNISOLONE 4 MG
TABLET, DOSE PACK ORAL
Qty: 21 TABLET | Refills: 0 | Status: SHIPPED | OUTPATIENT
Start: 2019-05-07 | End: 2019-10-16

## 2019-05-07 RX ORDER — NYSTATIN 100000/ML
500000 SUSPENSION, ORAL (FINAL DOSE FORM) ORAL 4 TIMES DAILY
Qty: 280 ML | Refills: 0 | Status: SHIPPED | OUTPATIENT
Start: 2019-05-07 | End: 2019-10-16

## 2019-05-07 NOTE — TELEPHONE ENCOUNTER
Discussed symptoms with patient and advised that he will need an appointment to determine appropriate treatment.   Patient is in the [place] area right now, and would like to be seen.   Advised him that Urgent care is open at this time, and he chooses to be seen in Urgent care.  Also advised patient that there are openings this week with a provider if he would like to choose this option.  No further questions.  Will call back if any other questions or concerns.  CEDRIC Castellano RN

## 2019-05-07 NOTE — TELEPHONE ENCOUNTER
Pt called wanting to talk with PCP's nurse requesting for a precription because he was having symptoms of sore throat and metallic taste in his mouth.  He said he thinks it's the same thing his daughter and daughter's  had and just wanted to get the same medication they were prescribed.  I informed him he will most likely need to be seen or a possible telephone visit before he would be able to get any medications.    Please call back to advise/schedule if needed   [phone]  Can leave a detailed message

## 2019-05-07 NOTE — PROGRESS NOTES
SUBJECTIVE:   Anam Wolf is a 58 year old male presenting with a chief complaint of having metallic taste.  Onset of symptoms was  1 week ago.  Course of illness is same.    Severity moderate  Current and Associated symptoms: bitter taste, metallic taste  Treatment measures tried include none.  Predisposing factors include other friends had same symptoms .    Past Medical History:   Diagnosis Date     Allergic rhinitis, cause unspecified     ragweed     DVT (deep venous thrombosis) (H)      Iatrogenic pulmonary embolism and infarction (H)     right lower extrem DVT with right PE, followed surgery for right knee     Left sided ulcerative (chronic) colitis (H)     initial presentation 2002 with fairly distal dz; flare beginning late 2005, hospitalized 3/2006; CT at that time showing bowel-wall thickening extending from transverse colon distal; refractory to med management, required colectomy 4/2006     Mild intermittent asthma 3/11/2005    prior diagnosis of mod persistent; currently off controller without persistent symptoms        Allergies   Allergen Reactions     Sulfa Drugs      rash, swelling     Family History   Problem Relation Age of Onset     Lipids Father      C.A.D. Father         MI related to surgery     Cancer - colorectal Father         diagnosis age 56     Diabetes Brother         type 2     Lipids Brother      Prostate Cancer No family hx of      Blood Disease No family hx of         denies family history of venous thromboembolic dz         Social History     Tobacco Use     Smoking status: Never Smoker     Smokeless tobacco: Never Used   Substance Use Topics     Alcohol use: Yes     Alcohol/week: 0.0 oz     Comment: rare       ROS:  CONSTITUTIONAL:NEGATIVE for fever, chills, change in weight  INTEGUMENTARY/SKIN: NEGATIVE for worrisome rashes, moles or lesions  EYES: NEGATIVE for vision changes or irritation  ENT/MOUTH: POSITIVE for bitter taste in mouth  RESP:NEGATIVE for significant cough or  SOB  CV: NEGATIVE for chest pain, palpitations or peripheral edema  MUSCULOSKELETAL: NEGATIVE for significant arthralgias or myalgia  NEURO: NEGATIVE for weakness, dizziness or paresthesias    OBJECTIVE  :/78 (BP Location: Right arm, Patient Position: Sitting, Cuff Size: Adult Large)   Pulse 75   Temp 97.3  F (36.3  C)   Resp 16   Wt 106.6 kg (235 lb)   SpO2 96%   BMI 32.78 kg/m    GENERAL APPEARANCE: healthy, alert and no distress  HENT: ear canals and TM's normal.  Nose and mouth without ulcers, erythema or lesions  NECK: supple, nontender, no lymphadenopathy  RESP: lungs clear to auscultation - no rales, rhonchi or wheezes  CV: regular rates and rhythm, normal S1 S2, no murmur noted  NEURO: Normal strength and tone, sensory exam grossly normal,  normal speech and mentation  SKIN: no suspicious lesions or rashes    ASSESSMENT/PLAN    ICD-10-CM    1. Metallic taste R43.8 nystatin (MYCOSTATIN) 864628 UNIT/ML suspension     methylPREDNISolone (MEDROL DOSEPAK) 4 MG tablet therapy pack   2. Primary bitter taste disorder R43.8 nystatin (MYCOSTATIN) 889325 UNIT/ML suspension     methylPREDNISolone (MEDROL DOSEPAK) 4 MG tablet therapy pack         Orders Placed This Encounter     nystatin (MYCOSTATIN) 422664 UNIT/ML suspension     methylPREDNISolone (MEDROL DOSEPAK) 4 MG tablet therapy pack       Due to atypical symptoms of bitter taste will try course of nystatin  He had a friend with same symptoms go to the ENT and was placed on high dose prednisone for viral infection  Advised to follow up with ENT if symptoms persist

## 2019-09-30 ENCOUNTER — HEALTH MAINTENANCE LETTER (OUTPATIENT)
Age: 59
End: 2019-09-30

## 2019-10-16 ENCOUNTER — HOSPITAL ENCOUNTER (OUTPATIENT)
Facility: CLINIC | Age: 59
Setting detail: OBSERVATION
Discharge: HOME | End: 2019-10-17
Attending: EMERGENCY MEDICINE | Admitting: HOSPITALIST
Payer: COMMERCIAL

## 2019-10-16 DIAGNOSIS — R19.7 NAUSEA VOMITING AND DIARRHEA: Primary | ICD-10-CM

## 2019-10-16 DIAGNOSIS — R11.2 NAUSEA VOMITING AND DIARRHEA: Primary | ICD-10-CM

## 2019-10-16 LAB
ALBUMIN SERPL-MCNC: 5.2 G/DL (ref 3.4–5)
ALP SERPL-CCNC: 99 U/L (ref 40–150)
ALT SERPL W P-5'-P-CCNC: 49 U/L (ref 0–70)
ANION GAP SERPL CALCULATED.3IONS-SCNC: 8 MMOL/L (ref 3–14)
AST SERPL W P-5'-P-CCNC: 40 U/L (ref 0–45)
BASOPHILS # BLD AUTO: 0 10E9/L (ref 0–0.2)
BASOPHILS NFR BLD AUTO: 0.3 %
BILIRUB SERPL-MCNC: 0.8 MG/DL (ref 0.2–1.3)
BUN SERPL-MCNC: 21 MG/DL (ref 7–30)
C COLI+JEJUNI+LARI FUSA STL QL NAA+PROBE: NOT DETECTED
C DIFF TOX B STL QL: NEGATIVE
CALCIUM SERPL-MCNC: 10.2 MG/DL (ref 8.5–10.1)
CHLORIDE SERPL-SCNC: 98 MMOL/L (ref 94–109)
CO2 SERPL-SCNC: 24 MMOL/L (ref 20–32)
CREAT SERPL-MCNC: 1.7 MG/DL (ref 0.66–1.25)
DIFFERENTIAL METHOD BLD: ABNORMAL
EC STX1 GENE STL QL NAA+PROBE: NOT DETECTED
EC STX2 GENE STL QL NAA+PROBE: NOT DETECTED
ENTERIC PATHOGEN COMMENT: NORMAL
EOSINOPHIL # BLD AUTO: 0.1 10E9/L (ref 0–0.7)
EOSINOPHIL NFR BLD AUTO: 0.4 %
ERYTHROCYTE [DISTWIDTH] IN BLOOD BY AUTOMATED COUNT: 12.6 % (ref 10–15)
GFR SERPL CREATININE-BSD FRML MDRD: 43 ML/MIN/{1.73_M2}
GLUCOSE SERPL-MCNC: 125 MG/DL (ref 70–99)
HCT VFR BLD AUTO: 54.9 % (ref 40–53)
HGB BLD-MCNC: 18.4 G/DL (ref 13.3–17.7)
IMM GRANULOCYTES # BLD: 0.1 10E9/L (ref 0–0.4)
IMM GRANULOCYTES NFR BLD: 0.4 %
LYMPHOCYTES # BLD AUTO: 1.8 10E9/L (ref 0.8–5.3)
LYMPHOCYTES NFR BLD AUTO: 14.7 %
MCH RBC QN AUTO: 29.6 PG (ref 26.5–33)
MCHC RBC AUTO-ENTMCNC: 33.5 G/DL (ref 31.5–36.5)
MCV RBC AUTO: 88 FL (ref 78–100)
MONOCYTES # BLD AUTO: 1.5 10E9/L (ref 0–1.3)
MONOCYTES NFR BLD AUTO: 12.4 %
NEUTROPHILS # BLD AUTO: 8.6 10E9/L (ref 1.6–8.3)
NEUTROPHILS NFR BLD AUTO: 71.8 %
NOROV GI+II ORF1-ORF2 JNC STL QL NAA+PR: NOT DETECTED
NRBC # BLD AUTO: 0 10*3/UL
NRBC BLD AUTO-RTO: 0 /100
PLATELET # BLD AUTO: 325 10E9/L (ref 150–450)
POTASSIUM SERPL-SCNC: 3.9 MMOL/L (ref 3.4–5.3)
PROT SERPL-MCNC: 9.3 G/DL (ref 6.8–8.8)
RBC # BLD AUTO: 6.21 10E12/L (ref 4.4–5.9)
RVA NSP5 STL QL NAA+PROBE: NOT DETECTED
SALMONELLA SP RPOD STL QL NAA+PROBE: NOT DETECTED
SHIGELLA SP+EIEC IPAH STL QL NAA+PROBE: NOT DETECTED
SODIUM SERPL-SCNC: 130 MMOL/L (ref 133–144)
SPECIMEN SOURCE: NORMAL
V CHOL+PARA RFBL+TRKH+TNAA STL QL NAA+PR: NOT DETECTED
WBC # BLD AUTO: 11.8 10E9/L (ref 4–11)
Y ENTERO RECN STL QL NAA+PROBE: NOT DETECTED

## 2019-10-16 PROCEDURE — 96374 THER/PROPH/DIAG INJ IV PUSH: CPT

## 2019-10-16 PROCEDURE — 80053 COMPREHEN METABOLIC PANEL: CPT | Performed by: EMERGENCY MEDICINE

## 2019-10-16 PROCEDURE — G0378 HOSPITAL OBSERVATION PER HR: HCPCS

## 2019-10-16 PROCEDURE — 87506 IADNA-DNA/RNA PROBE TQ 6-11: CPT | Performed by: EMERGENCY MEDICINE

## 2019-10-16 PROCEDURE — 25000128 H RX IP 250 OP 636: Performed by: EMERGENCY MEDICINE

## 2019-10-16 PROCEDURE — 25000132 ZZH RX MED GY IP 250 OP 250 PS 637: Performed by: HOSPITALIST

## 2019-10-16 PROCEDURE — 99219 ZZC INITIAL OBSERVATION CARE,LEVL II: CPT | Performed by: HOSPITALIST

## 2019-10-16 PROCEDURE — 25800030 ZZH RX IP 258 OP 636: Performed by: HOSPITALIST

## 2019-10-16 PROCEDURE — 85025 COMPLETE CBC W/AUTO DIFF WBC: CPT | Performed by: EMERGENCY MEDICINE

## 2019-10-16 PROCEDURE — 99285 EMERGENCY DEPT VISIT HI MDM: CPT

## 2019-10-16 PROCEDURE — 99207 ZZC CDG-MDM COMPONENT: MEETS LOW - DOWN CODED: CPT | Performed by: HOSPITALIST

## 2019-10-16 PROCEDURE — 87493 C DIFF AMPLIFIED PROBE: CPT | Performed by: EMERGENCY MEDICINE

## 2019-10-16 RX ORDER — ACETAMINOPHEN 325 MG/1
650 TABLET ORAL EVERY 4 HOURS PRN
Status: DISCONTINUED | OUTPATIENT
Start: 2019-10-16 | End: 2019-10-16

## 2019-10-16 RX ORDER — ACETAMINOPHEN 500 MG
500-1000 TABLET ORAL EVERY 6 HOURS PRN
Status: DISCONTINUED | OUTPATIENT
Start: 2019-10-16 | End: 2019-10-17 | Stop reason: HOSPADM

## 2019-10-16 RX ORDER — ACETAMINOPHEN 650 MG/1
650 SUPPOSITORY RECTAL EVERY 4 HOURS PRN
Status: DISCONTINUED | OUTPATIENT
Start: 2019-10-16 | End: 2019-10-17 | Stop reason: HOSPADM

## 2019-10-16 RX ORDER — SIMVASTATIN 20 MG
20 TABLET ORAL DAILY
Status: DISCONTINUED | OUTPATIENT
Start: 2019-10-16 | End: 2019-10-17 | Stop reason: HOSPADM

## 2019-10-16 RX ORDER — HYDROMORPHONE HYDROCHLORIDE 1 MG/ML
0.2 INJECTION, SOLUTION INTRAMUSCULAR; INTRAVENOUS; SUBCUTANEOUS EVERY 4 HOURS PRN
Status: DISCONTINUED | OUTPATIENT
Start: 2019-10-16 | End: 2019-10-17 | Stop reason: HOSPADM

## 2019-10-16 RX ORDER — HYDROMORPHONE HYDROCHLORIDE 1 MG/ML
0.5 INJECTION, SOLUTION INTRAMUSCULAR; INTRAVENOUS; SUBCUTANEOUS
Status: DISCONTINUED | OUTPATIENT
Start: 2019-10-16 | End: 2019-10-16

## 2019-10-16 RX ORDER — ACETAMINOPHEN 500 MG
500-1000 TABLET ORAL EVERY 6 HOURS PRN
Status: ON HOLD | COMMUNITY
End: 2019-10-17

## 2019-10-16 RX ORDER — SODIUM CHLORIDE 9 MG/ML
INJECTION, SOLUTION INTRAVENOUS CONTINUOUS
Status: DISCONTINUED | OUTPATIENT
Start: 2019-10-16 | End: 2019-10-17 | Stop reason: HOSPADM

## 2019-10-16 RX ORDER — ONDANSETRON 4 MG/1
4 TABLET, ORALLY DISINTEGRATING ORAL EVERY 6 HOURS PRN
Status: DISCONTINUED | OUTPATIENT
Start: 2019-10-16 | End: 2019-10-17 | Stop reason: HOSPADM

## 2019-10-16 RX ORDER — DIPHENHYDRAMINE HCL 25 MG
25 CAPSULE ORAL
Status: DISCONTINUED | OUTPATIENT
Start: 2019-10-16 | End: 2019-10-17 | Stop reason: HOSPADM

## 2019-10-16 RX ORDER — DIPHENHYDRAMINE HCL 25 MG
25 TABLET ORAL
COMMUNITY
End: 2024-03-13

## 2019-10-16 RX ORDER — ONDANSETRON 2 MG/ML
4 INJECTION INTRAMUSCULAR; INTRAVENOUS EVERY 6 HOURS PRN
Status: DISCONTINUED | OUTPATIENT
Start: 2019-10-16 | End: 2019-10-17 | Stop reason: HOSPADM

## 2019-10-16 RX ORDER — ONDANSETRON 2 MG/ML
4 INJECTION INTRAMUSCULAR; INTRAVENOUS ONCE
Status: COMPLETED | OUTPATIENT
Start: 2019-10-16 | End: 2019-10-16

## 2019-10-16 RX ORDER — IBUPROFEN 200 MG
200 TABLET ORAL PRN
Status: ON HOLD | COMMUNITY
End: 2019-10-17

## 2019-10-16 RX ORDER — HYDROCODONE BITARTRATE AND ACETAMINOPHEN 5; 325 MG/1; MG/1
1-2 TABLET ORAL EVERY 4 HOURS PRN
Status: DISCONTINUED | OUTPATIENT
Start: 2019-10-16 | End: 2019-10-17 | Stop reason: HOSPADM

## 2019-10-16 RX ORDER — NALOXONE HYDROCHLORIDE 0.4 MG/ML
.1-.4 INJECTION, SOLUTION INTRAMUSCULAR; INTRAVENOUS; SUBCUTANEOUS
Status: DISCONTINUED | OUTPATIENT
Start: 2019-10-16 | End: 2019-10-17 | Stop reason: HOSPADM

## 2019-10-16 RX ADMIN — SIMVASTATIN 20 MG: 20 TABLET, FILM COATED ORAL at 15:10

## 2019-10-16 RX ADMIN — SODIUM CHLORIDE: 9 INJECTION, SOLUTION INTRAVENOUS at 21:35

## 2019-10-16 RX ADMIN — ONDANSETRON HYDROCHLORIDE 4 MG: 2 INJECTION, SOLUTION INTRAMUSCULAR; INTRAVENOUS at 10:40

## 2019-10-16 RX ADMIN — HYDROMORPHONE HYDROCHLORIDE 0.5 MG: 1 INJECTION, SOLUTION INTRAMUSCULAR; INTRAVENOUS; SUBCUTANEOUS at 10:49

## 2019-10-16 RX ADMIN — SODIUM CHLORIDE 1000 ML: 9 INJECTION, SOLUTION INTRAVENOUS at 10:40

## 2019-10-16 RX ADMIN — SODIUM CHLORIDE: 9 INJECTION, SOLUTION INTRAVENOUS at 15:09

## 2019-10-16 RX ADMIN — SODIUM CHLORIDE 1000 ML: 9 INJECTION, SOLUTION INTRAVENOUS at 11:50

## 2019-10-16 ASSESSMENT — ENCOUNTER SYMPTOMS
VOMITING: 1
BLOOD IN STOOL: 0
NAUSEA: 1
FEVER: 0
ABDOMINAL PAIN: 1
DIARRHEA: 1

## 2019-10-16 ASSESSMENT — MIFFLIN-ST. JEOR: SCORE: 1935.13

## 2019-10-16 NOTE — H&P
[facility]    History and Physical - Hospitalist Service       Date of Admission:  10/16/2019    Assessment & Plan   Anam Wolf is a 59 year old male with history Ulcerative colitis (dignosed in 2002), colectomy with ileostomy in 2006 admitted on 10/16/2019 with nausea, vomiting, diarrhea and abdominal cramping consistent with a gastroenteritis.    N/V/D/abdominal cramping    - likely due to gastroenteritis    - has pending enteric panel    - C Diff sent from ED, but no risk factors/history    - IVF at 150    - anti-emetics    - PRN Tylenol/Norco for cramping, will add IV dilaudid in case they worsen    - clears: advance as tolerated    Acute renal failure    - due to N/V/D    - IVF overnight, re-check in am    Ulcerative Colitis    - had colectomy with ileostomy in 2006    - has been stable without flares since that time    - does not even follow-up with GI    Wife in room. Questions answered    Full Code     Diet: Advance Diet as Tolerated: Clear Liquid Diet    DVT Prophylaxis: Low Risk/Ambulatory with no VTE prophylaxis indicated  Freeman Catheter: not present  Code Status: Full Code      Disposition Plan   Expected discharge: Tomorrow, recommended to prior living arrangement once tolerating PO.  Entered: Keith Carpenter MD 10/16/2019, 2:10 PM     The patient's care was discussed with the Bedside Nurse, Patient, Patient's Family and ED provider.    Keith Carpenter MD  [facility]    ______________________________________________________________________    Chief Complaint   Nausea, vomiting, diarrhea, abdominal cramping    History is obtained from the patient    History of Present Illness   Anam Wolf is a 59 year old male with history Ulcerative colitis (dignosed in 2002), colectomy with ileostomy in 2006 admitted on 10/16/2019 with nausea, vomiting, diarrhea and abdominal cramping consistent with a gastroenteritis. Patient states last Monday night/early am Tuesday he started  "having abdominal pain, vomiting and diarrhea. He states he has vomited about 5-6 times. He has changed his colostomy 20+ times. His output in his colostomy has been \"pure water\". He feels like maybe had had a fever yesterday (did not take temp). He denies chest pain, sob, URI symptoms, urinary symptoms. He denies recent travel or sick contacts. He states he ate chicken and fries at a restaurant on Monday. No other suspicious foods.      Review of Systems    The 10 point Review of Systems is negative other than noted in the HPI or here.     Past Medical History    I have reviewed this patient's medical history and updated it with pertinent information if needed.   Past Medical History:   Diagnosis Date     Allergic rhinitis, cause unspecified     ragweed     DVT (deep venous thrombosis) (H)      Iatrogenic pulmonary embolism and infarction (H)     right lower extrem DVT with right PE, followed surgery for right knee     Left sided ulcerative (chronic) colitis (H)     initial presentation 2002 with fairly distal dz; flare beginning late 2005, hospitalized 3/2006; CT at that time showing bowel-wall thickening extending from transverse colon distal; refractory to med management, required colectomy 4/2006     Mild intermittent asthma 3/11/2005    prior diagnosis of mod persistent; currently off controller without persistent symptoms       Past Surgical History   I have reviewed this patient's surgical history and updated it with pertinent information if needed.  Past Surgical History:   Procedure Laterality Date     C REMOVAL COLON/PROCTECTOMY/ILEOSTOMY  4/18/2006    Total proctocolectomy with intersphincteric proctectomy and end ileostomy     HC KNEE SCOPE,MED/LAT MENISECTOMY  12/10/2004    Right knee, partial medial meniscectomy and chondroplasty medial femoral condyle     HC REMOVAL OF TONSILS,<11 Y/O       HC REPAIR ING HERNIA,5+Y/O,REDUCIBL         Social History   I have reviewed this patient's social history and " updated it with pertinent information if needed.  Social History     Tobacco Use     Smoking status: Never Smoker     Smokeless tobacco: Never Used   Substance Use Topics     Alcohol use: Yes     Alcohol/week: 0.0 standard drinks     Comment: rare     Drug use: No       Family History   I have reviewed this patient's family history and updated it with pertinent information if needed.   Family History   Problem Relation Age of Onset     Lipids Father      C.A.D. Father         MI related to surgery     Cancer - colorectal Father         diagnosis age 56     Diabetes Brother         type 2     Lipids Brother      Prostate Cancer No family hx of      Blood Disease No family hx of         denies family history of venous thromboembolic dz       Prior to Admission Medications   Prior to Admission Medications   Prescriptions Last Dose Informant Patient Reported? Taking?   acetaminophen (TYLENOL) 500 MG tablet 10/15/2019 at Unknown time  Yes Yes   Sig: Take 500-1,000 mg by mouth every 6 hours as needed for mild pain   diphenhydrAMINE (BENADRYL) 25 MG tablet 10/14/2019  Yes Yes   Sig: Take 25 mg by mouth nightly as needed for itching or allergies   ibuprofen (ADVIL/MOTRIN) 200 MG tablet 10/15/2019 at Unknown time  Yes Yes   Sig: Take 200 mg by mouth as needed for mild pain   simvastatin (ZOCOR) 20 MG tablet 10/15/2019 at Unknown time  No Yes   Sig: Take 1 tablet (20 mg) by mouth daily   triamcinolone (KENALOG) 0.1 % cream Past Month at Unknown time  No Yes   Sig: APPLY TO RASH TID PRN      Facility-Administered Medications: None     Allergies   Allergies   Allergen Reactions     Sulfa Drugs      rash, swelling       Physical Exam   Vital Signs: Temp: 96.4  F (35.8  C) Temp src: Oral BP: 130/82 Pulse: 83 Heart Rate: 73 Resp: 16 SpO2: 95 % O2 Device: None (Room air)    Weight: 244 lbs 4.31 oz    Constitutional: awake, alert, cooperative, no apparent distress, and appears stated age  Eyes: Lids and lashes normal, pupils equal,  round and reactive to light, extra ocular muscles intact, sclera clear, conjunctiva normal  ENT: Normocephalic, without obvious abnormality, atraumatic, sinuses nontender on palpation, external ears without lesions, oral pharynx with moist mucous membranes, tonsils without erythema or exudates, gums normal and good dentition.  Respiratory: No increased work of breathing, good air exchange, clear to auscultation bilaterally, no crackles or wheezing  Cardiovascular: Normal apical impulse, regular rate and rhythm, normal S1 and S2, no S3 or S4, and no murmur noted  GI: soft. NT. +BS. Colostomy empty (he just emptied it)  Skin: no bruising or bleeding  Musculoskeletal: no lower extremity pitting edema present    Data   Data reviewed today: I reviewed all medications, new labs and imaging results over the last 24 hours. I personally reviewed no images or EKG's today.    Most Recent 3 CBC's:  Recent Labs   Lab Test 10/16/19  1028   WBC 11.8*   HGB 18.4*   MCV 88        Most Recent 3 BMP's:  Recent Labs   Lab Test 10/16/19  1028 12/11/18  0905 09/06/17  0928   * 141 141   POTASSIUM 3.9 4.1 4.2   CHLORIDE 98 108 110*   CO2 24 25 24   BUN 21 12 16   CR 1.70* 1.23 1.18   ANIONGAP 8 8 7   AL 10.2* 9.4 9.1   * 97 98     Most Recent 2 LFT's:  Recent Labs   Lab Test 10/16/19  1028 12/11/18  0905   AST 40 36   ALT 49 52   ALKPHOS 99 52   BILITOTAL 0.8 0.8     No results found for this or any previous visit (from the past 24 hour(s)).

## 2019-10-16 NOTE — PLAN OF CARE
ROOM # 233    Living Situation (if not independent, order SW consult):home w/spouse  Facility name:  : n/a    Activity level at baseline: ind  Activity level on admit: ind      Patient registered to observation; given Patient Bill of Rights; given the opportunity to ask questions about observation status and their plan of care.  Patient has been oriented to the observation room, bathroom and call light is in place.    Discussed discharge goals and expectations with patient/family.

## 2019-10-16 NOTE — PHARMACY-ADMISSION MEDICATION HISTORY
Admission medication history interview status for this patient is complete. See [facility] admission navigator for allergy information, prior to admission medications and immunization status.     Medication history interview source(s):Patient  Medication history resources (including written lists, pill bottles, clinic record):None  Primary [facility]    Changes made to PTA medication list:  Added: Ibuprofen, Tylenol, Benadryl  Deleted: Medrol, Nystatin  Changed: none    Actions taken by pharmacist (provider contacted, etc):None     Additional medication history information:None    Medication reconciliation/reorder completed by provider prior to medication history?  Yes     Do you take OTC medications (eg tylenol, ibuprofen, fish oil, eye/ear drops, etc)? Yes     For patients on insulin therapy: No      Prior to Admission medications    Medication Sig Last Dose Taking? Auth Provider   acetaminophen (TYLENOL) 500 MG tablet Take 500-1,000 mg by mouth every 6 hours as needed for mild pain 10/15/2019 at Unknown time Yes Unknown, Entered By History   diphenhydrAMINE (BENADRYL) 25 MG tablet Take 25 mg by mouth nightly as needed for itching or allergies 10/14/2019 Yes Unknown, Entered By History   ibuprofen (ADVIL/MOTRIN) 200 MG tablet Take 200 mg by mouth as needed for mild pain 10/15/2019 at Unknown time Yes Unknown, Entered By History   simvastatin (ZOCOR) 20 MG tablet Take 1 tablet (20 mg) by mouth daily 10/15/2019 at Unknown time Yes Villa Garland MD   triamcinolone (KENALOG) 0.1 % cream APPLY TO RASH TID PRN Past Month at Unknown time Yes Villa Garland MD

## 2019-10-16 NOTE — ED TRIAGE NOTES
Pt has nausea, vomiting and diarrhea since yesterday morning.  He has ostomy and abdominal cramping.  He emptied pouch at least 20 times.

## 2019-10-16 NOTE — ED NOTES
.  [facility]  ED Nurse Handoff Report    Anam Wolf is a 59 year old male   ED Chief complaint: Nausea, Vomiting, & Diarrhea and Abdominal Pain  . ED Diagnosis:   Final diagnoses:   Nausea vomiting and diarrhea     Allergies:   Allergies   Allergen Reactions     Sulfa Drugs      rash, swelling       Code Status: Full Code  Activity level - Baseline/Home:  Independent. Activity Level - Current:   Stand by Assist. Lift room needed: No. Bariatric: No   Needed: No   Isolation: No. Infection: Not Applicable.     Vital Signs:   Vitals:    10/16/19 1115 10/16/19 1130 10/16/19 1145 10/16/19 1200   BP: (!) 142/93 (!) 136/103 (!) 142/94 (!) 146/86   Pulse: 85 112 87 83   Resp:       Temp:       TempSrc:       SpO2: 95%   97%   Weight:       Height:           Cardiac Rhythm:  ,      Pain level: 0-10 Pain Scale: 0  Patient confused: No. Patient Falls Risk: Yes.   Elimination Status: Has voided   Patient Report - Initial Complaint: 59 year old male, with a history of ulcerative colitis and s/p total proctocolectomy w/ intersphincteric proctectomy & end ileostomy, who presents with his wife to the ED for evaluation of nausea, vomiting, and diarrhea. The patient's wife reports he developed nausea, vomiting, and nonbloody diarrhea yesterday morning. He has associated abdominal cramping/spasming which he rates as a 10/10. He has emptied his ostomy bag at least 20 times. The wife notes he had been on Zyrtec since August (2 months ago) but recently developed generalized itchiness after stopping. He has intermittently been taking Benadryl for the itchiness. The patient denies any fever or other symptoms/complaints.   Focused Assessment:   Gastrointestinal - GI WDL: -WDL except; all  Nausea/Vomiting Signs/Symptoms: nausea intermittent; emesis  Last Bowel Movement: 10/16/19  Stool Consistency: liquid; watery  Stool Color: green (dark) GI Signs/Symptoms: nausea; vomiting; diarrhea  Gastrointestinal Comment:  "diarrhea past few days, emesis and nausea starting yesterday   Stoma - GI Stoma Type: colostomy  LA      10:50 Musculoskeletal Musculoskeletal - Musculoskeletal WDL: -WDL except; all  Pain Body Location:  (\"severe\" cramping t/o entire body since this morning; rating 10/10 when present)        Tests Performed: labs. Abnormal Results: .  Labs Ordered and Resulted from Time of ED Arrival Up to the Time of Departure from the ED   CBC WITH PLATELETS DIFFERENTIAL - Abnormal; Notable for the following components:       Result Value    WBC 11.8 (*)     RBC Count 6.21 (*)     Hemoglobin 18.4 (*)     Hematocrit 54.9 (*)     Absolute Neutrophil 8.6 (*)     Absolute Monocytes 1.5 (*)     All other components within normal limits   COMPREHENSIVE METABOLIC PANEL - Abnormal; Notable for the following components:    Sodium 130 (*)     Glucose 125 (*)     Creatinine 1.70 (*)     GFR Estimate 43 (*)     GFR Estimate If Black 49 (*)     Calcium 10.2 (*)     Albumin 5.2 (*)     Protein Total 9.3 (*)     All other components within normal limits   PERIPHERAL IV CATHETER   ENTERIC BACTERIA AND VIRUS PANEL BY GEORGETTE STOOL   CLOSTRIDIUM DIFFICILE TOXIN B     .   Treatments provided: see ED meds below  Family Comments: NA  OBS brochure/video discussed/provided to patient:  Yes  ED Medications:   Medications   HYDROmorphone (PF) (DILAUDID) injection 0.5 mg (0.5 mg Intravenous Given 10/16/19 1049)   0.9% sodium chloride BOLUS (0 mLs Intravenous Stopped 10/16/19 1110)   ondansetron (ZOFRAN) injection 4 mg (4 mg Intravenous Given 10/16/19 1040)   0.9% sodium chloride BOLUS (1,000 mLs Intravenous New Bag 10/16/19 1150)     Drips infusing:  No  For the majority of the shift, the patient's behavior Green. Interventions performed were NA.     Severe Sepsis OR Septic Shock Diagnosis Present: No      ED Nurse Name/Phone Number: Clau Marroquin RN ERA - patient is now in ERC with Eric CHURCHILL RN taking over  12:27 PM    RECEIVING UNIT ED HANDOFF " REVIEW    Above ED Nurse Handoff Report was reviewed: Yes  Reviewed by: Jaylin Vasquez RN on October 16, 2019 at 12:47 PM

## 2019-10-16 NOTE — ED PROVIDER NOTES
History     Chief Complaint:  Nausea, Vomiting, & Diarrhea     HPI   Anam Wolf is a 59 year old male, with a history of ulcerative colitis and s/p total proctocolectomy w/ intersphincteric proctectomy & end ileostomy, who presents with his wife to the ED for evaluation of nausea, vomiting, and diarrhea. The patient's wife reports he developed nausea, vomiting, and nonbloody diarrhea yesterday morning. He has associated abdominal cramping/spasming which he rates as a 10/10. He has emptied his ostomy bag at least 20 times. The wife notes he had been on Zyrtec since August (2 months ago) but recently developed generalized itchiness after stopping. He has intermittently been taking Benadryl for the itchiness. The patient denies any fever or other symptoms/complaints.      Allergies:  Sulfa drugs: rash & swelling     Medications:    Simvastatin      Past Medical History:    Chronic ulcerative colitis, left side   Asthma  PE  DVT     Past Surgical History:    Total proctocolectomy w/ intersphincteric proctectomy & end ileostomy   Right knee partial medial meniscectomy & medial femoral condyle chondroplasty   Tonsillectomy  Inguinal herniorrhaphy     Family History:    Lipids, CAD, MI, colorectal cancer: father  Type 2 DM, lipids: brother    Social History:  Smoking status: Never smoker    Alcohol use: Rarely   Presents to ED with wife    Marital Status:   [2]     Review of Systems   Constitutional: Negative for fever.   Gastrointestinal: Positive for abdominal pain, diarrhea, nausea and vomiting. Negative for blood in stool.   All other systems reviewed and are negative.    Physical Exam     Patient Vitals for the past 24 hrs:   BP Temp Temp src Pulse Heart Rate Resp SpO2 Height Weight   10/16/19 1145 (!) 142/94 -- -- 87 -- -- -- -- --   10/16/19 1130 (!) 136/103 -- -- 112 -- -- -- -- --   10/16/19 1115 (!) 142/93 -- -- 85 -- -- 95 % -- --   10/16/19 1100 (!) 152/103 -- -- 87 -- -- 94 % -- --   10/16/19  "1052 -- -- -- -- -- -- 97 % -- --   10/16/19 1049 (!) 136/104 -- -- 94 -- -- -- -- --   10/16/19 1042 (!) 151/93 -- -- 97 -- -- 98 % -- --   10/16/19 1038 (!) 158/101 -- -- 97 -- -- 98 % -- --   10/16/19 0924 (!) 130/91 96.5  F (35.8  C) Temporal -- 109 20 98 % 1.803 m (5' 11\") 110.8 kg (244 lb 4.3 oz)     Physical Exam  Nursing note and vitals reviewed.  Constitutional: Cooperative. Appears uncomfortable.   HENT:   Mouth/Throat: Moist mucous membranes.   Eyes: EOMI, nonicteric sclera  Cardiovascular: Normal rate, regular rhythm, no murmurs, rubs, or gallops  Pulmonary/Chest: Effort normal and breath sounds normal. No respiratory distress. No wheezes. No rales.   Abdominal: Soft. Nontender, nondistended, no guarding or rigidity. BS hyperactive.   Musculoskeletal: Normal range of motion.   Neurological: Alert. Moves all extremities spontaneously.   Skin: Skin is warm and dry. No rash noted.   Psychiatric: Normal mood and affect.       Emergency Department Course     Laboratory:  Laboratory findings were communicated with the patient and family who voiced understanding of the findings.    CBC: WBC 11.8(H), HGB 18.4(H), o/w WNL ()  CMP: (L), Glucose 125(H), GFR estimate 43(L), Calcium 10.2(H), Albumin 5.2(H), Protein total 9.3(H), Creatinine 1.70(H), o/w WNL     C diff toxin B PCR: In process  Enteric bacteria & virus panel-GEORGETTE stool: In process    Interventions:  1040: NS 1L Bolus IV  1040: Zofran 4mg IV  1049: Dilaudid 0.5mg IV  1150: NS 1L Bolus IV    Emergency Department Course:  Past medical records, nursing notes, and vitals reviewed.  1027: I performed an exam of the patient and obtained history, as documented above.    1028: IV inserted and blood drawn.    1121: I rechecked the patient. Explained findings to patient and wife. Discussed likely admission.     1206: I spoke to Dr. Carpenter of the hospitalist service who accepts the patient for admission.     Findings and plan explained to the Patient and " "spouse who consents to admission. Discussed the patient with Dr. Carpenter, who will admit the patient to an obs bed for further monitoring, evaluation, and treatment.    Impression & Plan      Medical Decision Making:  Pt presents with nausea, vomiting, and diarrhea with substantial ostomy output. Abd exam is nontender, though pt is having full body \"spasms\" on my initial eval which he reports feels like all his muscles. Workup suggests dehydration with TK and hyponatremia. Given the substantial amount of ostomy output, I believe he needs admission for continuous IVF until output slows. Considered this could be obstruction, but bowel sounds are hyperactive and his abd is minimally tender. May also be high output ileostomy, but pt is vomiting as well, more suggestive of viral illness. Dr. Carpenter from our hospitalist service accepts for admission. All questions answered.     Diagnosis:    ICD-10-CM   1. Nausea vomiting and diarrhea R11.2    R19.7     Disposition: Patient admitted to an obs bed by Dr. Jayden Jenkins  10/16/2019   [facility] EMERGENCY DEPARTMENT    Scribe Disclosure:  I, Anuradha Jenkins, am serving as a scribe at 10:27 AM on 10/16/2019 to document services personally performed by Angel Moe MD based on my observations and the provider's statements to me.        Angel Moe MD  10/16/19 [id number]    "

## 2019-10-16 NOTE — PLAN OF CARE
"PRIMARY DIAGNOSIS: GASTROENTERITIS    OUTPATIENT/OBSERVATION GOALS TO BE MET BEFORE DISCHARGE  1. Orthostatic performed: No    2. Tolerating PO fluid and/or antibiotics (if applicable): Yes    3. Nausea/Vomiting/Diarrhea symptoms improved: No,  watery, loose, and green    4. Pain status: Pain free.    5. Return to near baseline physical activity: Yes    Discharge Planner Nurse   Safe discharge environment identified: Yes  Barriers to discharge: No       Entered by: Jaylin Vasquez 10/16/2019 5:44 PM     Please review provider order for any additional goals.   Nurse to notify provider when observation goals have been met and patient is ready for discharge.    Pt A&O. VSS. LS clear on RA, denies SOB. Active BS, ostomy in place, watery/loose/green stool per pt report, ind w/ostomy cares, denies nausea. Voiding in the BR w/o difficulty. Denies pain. Up ind. On clear diet, tolerated well, advanced to fulls. C.diff neg, enteric pending.     Plan: IVF, pain control.    BP (!) 140/84 (BP Location: Right arm)   Pulse 83   Temp 97.4  F (36.3  C) (Oral)   Resp 16   Ht 1.803 m (5' 11\")   Wt 110.8 kg (244 lb 4.3 oz)   SpO2 96%   BMI 34.07 kg/m      "

## 2019-10-17 VITALS
DIASTOLIC BLOOD PRESSURE: 70 MMHG | OXYGEN SATURATION: 95 % | WEIGHT: 244.27 LBS | HEIGHT: 71 IN | SYSTOLIC BLOOD PRESSURE: 127 MMHG | HEART RATE: 83 BPM | BODY MASS INDEX: 34.2 KG/M2 | RESPIRATION RATE: 16 BRPM | TEMPERATURE: 96.4 F

## 2019-10-17 LAB
ANION GAP SERPL CALCULATED.3IONS-SCNC: 4 MMOL/L (ref 3–14)
BASOPHILS # BLD AUTO: 0 10E9/L (ref 0–0.2)
BASOPHILS NFR BLD AUTO: 0.4 %
BUN SERPL-MCNC: 16 MG/DL (ref 7–30)
CALCIUM SERPL-MCNC: 8.4 MG/DL (ref 8.5–10.1)
CHLORIDE SERPL-SCNC: 112 MMOL/L (ref 94–109)
CO2 SERPL-SCNC: 22 MMOL/L (ref 20–32)
CREAT SERPL-MCNC: 1.27 MG/DL (ref 0.66–1.25)
DIFFERENTIAL METHOD BLD: NORMAL
EOSINOPHIL # BLD AUTO: 0.1 10E9/L (ref 0–0.7)
EOSINOPHIL NFR BLD AUTO: 2.1 %
ERYTHROCYTE [DISTWIDTH] IN BLOOD BY AUTOMATED COUNT: 12.8 % (ref 10–15)
GFR SERPL CREATININE-BSD FRML MDRD: 61 ML/MIN/{1.73_M2}
GLUCOSE SERPL-MCNC: 99 MG/DL (ref 70–99)
HCT VFR BLD AUTO: 45 % (ref 40–53)
HGB BLD-MCNC: 14.6 G/DL (ref 13.3–17.7)
IMM GRANULOCYTES # BLD: 0 10E9/L (ref 0–0.4)
IMM GRANULOCYTES NFR BLD: 0.4 %
LYMPHOCYTES # BLD AUTO: 1.7 10E9/L (ref 0.8–5.3)
LYMPHOCYTES NFR BLD AUTO: 25.9 %
MCH RBC QN AUTO: 29.3 PG (ref 26.5–33)
MCHC RBC AUTO-ENTMCNC: 32.4 G/DL (ref 31.5–36.5)
MCV RBC AUTO: 90 FL (ref 78–100)
MONOCYTES # BLD AUTO: 1 10E9/L (ref 0–1.3)
MONOCYTES NFR BLD AUTO: 15.4 %
NEUTROPHILS # BLD AUTO: 3.7 10E9/L (ref 1.6–8.3)
NEUTROPHILS NFR BLD AUTO: 55.8 %
NRBC # BLD AUTO: 0 10*3/UL
NRBC BLD AUTO-RTO: 0 /100
PLATELET # BLD AUTO: 243 10E9/L (ref 150–450)
POTASSIUM SERPL-SCNC: 4.3 MMOL/L (ref 3.4–5.3)
RBC # BLD AUTO: 4.98 10E12/L (ref 4.4–5.9)
SODIUM SERPL-SCNC: 138 MMOL/L (ref 133–144)
WBC # BLD AUTO: 6.7 10E9/L (ref 4–11)

## 2019-10-17 PROCEDURE — 99217 ZZC OBSERVATION CARE DISCHARGE: CPT | Performed by: INTERNAL MEDICINE

## 2019-10-17 PROCEDURE — 25000128 H RX IP 250 OP 636: Performed by: HOSPITALIST

## 2019-10-17 PROCEDURE — 25800030 ZZH RX IP 258 OP 636: Performed by: HOSPITALIST

## 2019-10-17 PROCEDURE — 90471 IMMUNIZATION ADMIN: CPT

## 2019-10-17 PROCEDURE — 85025 COMPLETE CBC W/AUTO DIFF WBC: CPT | Performed by: HOSPITALIST

## 2019-10-17 PROCEDURE — 25000132 ZZH RX MED GY IP 250 OP 250 PS 637: Performed by: HOSPITALIST

## 2019-10-17 PROCEDURE — G0378 HOSPITAL OBSERVATION PER HR: HCPCS

## 2019-10-17 PROCEDURE — 90682 RIV4 VACC RECOMBINANT DNA IM: CPT | Performed by: HOSPITALIST

## 2019-10-17 PROCEDURE — G0008 ADMIN INFLUENZA VIRUS VAC: HCPCS

## 2019-10-17 PROCEDURE — 36415 COLL VENOUS BLD VENIPUNCTURE: CPT | Performed by: HOSPITALIST

## 2019-10-17 PROCEDURE — 80048 BASIC METABOLIC PNL TOTAL CA: CPT | Performed by: HOSPITALIST

## 2019-10-17 RX ORDER — ACETAMINOPHEN 325 MG/1
325-650 TABLET ORAL EVERY 4 HOURS PRN
COMMUNITY
Start: 2019-10-17 | End: 2023-10-04

## 2019-10-17 RX ADMIN — SODIUM CHLORIDE: 9 INJECTION, SOLUTION INTRAVENOUS at 04:25

## 2019-10-17 RX ADMIN — INFLUENZA A VIRUS A/BRISBANE/02/2018 (H1N1) RECOMBINANT HEMAGGLUTININ ANTIGEN, INFLUENZA A VIRUS A/KANSAS/14/2017 (H3N2) RECOMBINANT HEMAGGLUTININ ANTIGEN, INFLUENZA B VIRUS B/PHUKET/3073/2013 RECOMBINANT HEMAGGLUTININ ANTIGEN, AND INFLUENZA B VIRUS B/MARYLAND/15/2016 RECOMBINANT HEMAGGLUTININ ANTIGEN 0.5 ML: 45; 45; 45; 45 INJECTION INTRAMUSCULAR at 10:58

## 2019-10-17 RX ADMIN — SIMVASTATIN 20 MG: 20 TABLET, FILM COATED ORAL at 08:48

## 2019-10-17 NOTE — PLAN OF CARE
PRIMARY DIAGNOSIS: GASTROENTERITIS    OUTPATIENT/OBSERVATION GOALS TO BE MET BEFORE DISCHARGE  1. Orthostatic performed: No    2. Tolerating PO fluid and/or antibiotics (if applicable): Yes    3. Nausea/Vomiting/Diarrhea symptoms improved: Yes    4. Pain status: Pain free.    5. Return to near baseline physical activity: Yes    Discharge Planner Nurse   Safe discharge environment identified: Yes  Barriers to discharge: No       Entered by: Jill Sepulveda 10/17/2019 4:35 AM     Please review provider order for any additional goals.   Nurse to notify provider when observation goals have been met and patient is ready for discharge.    A&Ox4. Up ad misha. PIV infusing. AVSS, RA. Denies pain/N/V/dizziness. Enteric panel neg, precautions DC'd. Emptying ileostomy indep, one stool overnight per pt report. Will continue to monitor.

## 2019-10-17 NOTE — PLAN OF CARE
PRIMARY DIAGNOSIS: GASTROENTERITIS    OUTPATIENT/OBSERVATION GOALS TO BE MET BEFORE DISCHARGE  1. Orthostatic performed: No    2. Tolerating PO fluid and/or antibiotics (if applicable): Yes    3. Nausea/Vomiting/Diarrhea symptoms improved: No,  watery, loose, and green    4. Pain status: Pain free.    5. Return to near baseline physical activity: Yes    Pt is A&Ox4. VSS. Pt denies any pain or nausea. Continues with watery output from ileostomy. Pt reports that output is slowing down but not back to baseline. Takes care of his own ostomy. Ind in room. IVF. C diff negative, enteric pending. Pt advanced to regular, tolerated well. Will continue to monitor.     Discharge Planner Nurse   Safe discharge environment identified: Yes  Barriers to discharge: No       Entered by: Whitney Freeman 10/17/2019 1:28 AM     Please review provider order for any additional goals.   Nurse to notify provider when observation goals have been met and patient is ready for discharge.

## 2019-10-17 NOTE — PLAN OF CARE
"Patient's After Visit Summary was reviewed with patient and/or spouse.   Patient verbalized understanding of After Visit Summary, recommended follow up and was given an opportunity to ask questions.   Discharge medications sent home with patient/family: No new medications started    Discharged with spouse and transport tech with all belongings. Patient medically cleared to discharge. Wife will transport home.   /70 (BP Location: Left arm)   Pulse 83   Temp 96.4  F (35.8  C) (Oral)   Resp 16   Ht 1.803 m (5' 11\")   Wt 110.8 kg (244 lb 4.3 oz)   SpO2 95%   BMI 34.07 kg/m    OBSERVATION patient IN TIME: 1200     "

## 2019-10-17 NOTE — DISCHARGE SUMMARY
[facility]    Discharge Summary  Hospitalist    Date of Admission:  10/16/2019  Date of Discharge:  10/17/2019  Discharging Provider: Donna Singh MD  Date of Service (when I saw the patient): 10/17/19    Discharge Diagnoses   Suspected gastroenteritis  Nausea  Vomiting  Diarrhea  Abdominal cramping  Acute kidney injury superimposed on CKD 2   History of ulcerative Colitis    History of Present Illness   Anam Wolf is an 59 year old man with PMH significant for ulcerative colitis (diagnosed in 2002, colectomy with ileostomy 2006), asthma, PE, and DVT. Patient presents to emergency department due to onset of nausea, vomiting, diarrhea, and abdominal cramping.  Patient was admitted to observation unit for suspicion of gastroenteritis.    Hospital Course   Anam Wolf was admitted on 10/16/2019.  The following problems were addressed during his hospitalization:    Suspected gastroenteritis  Nausea  Vomiting  Diarrhea  Abdominal cramping  Note that C. difficile testing was negative.    Enteric panel negative for Campylobacter, Salmonella, Shigella, vibrio, rotavirus, Shiga toxin 1 and 2, norovirus I and II, and Yersinia.  Patient reports significant improvement overnight.  No longer having any pain.  Ostomy output has significantly decreased.  Patient states that he is tolerating a diet.  Ate a sandwich last night.  Patient feels well enough for discharge at this time.  Not needing any medications for pain and declines PRN Zofran at discharge.  Certainly possible that this was a mild ulcerative colitis flare.  Patient has not had follow-up in greater than 10 years with GI.  Given rapid improvement and that patient is now tolerating diet, will not pursue further work-up at this time, there have suggested that patient consider resuming care with GI.    Acute kidney injury superimposed on CKD 2   TK secondary to vomiting and diarrhea with poor p.o. intake.  Patient is now tolerating diet.   Renal function improved to baseline with IV fluids.    History of ulcerative Colitis  Patient was diagnosed in 2002 and had colectomy with ileostomy in 2006.  Patient has been stable without flare since that time.  Certainly possible that above symptoms were a mild flare, though given the rapidity of improvement, will not evaluate further at this time.  Did discuss with patient and recommended considering resuming care with GI for further evaluation.    Donna Singh MD FACP  Hospitalist Service  [facility]      Significant Results and Procedures   Negative C diff testing and negative     Pending Results   None    Code Status   Full Code       Primary Care Physician   Villa Garland MD    Physical Exam   Temp: 97.2  F (36.2  C) Temp src: Oral BP: 117/59 Pulse: 83 Heart Rate: 77 Resp: 16 SpO2: 95 % O2 Device: None (Room air)    Vitals:    10/16/19 0924   Weight: 110.8 kg (244 lb 4.3 oz)     Vital Signs with Ranges  Temp:  [96.4  F (35.8  C)-98.8  F (37.1  C)] 97.2  F (36.2  C)  Pulse:  [] 83  Heart Rate:  [69-86] 77  Resp:  [16-20] 16  BP: (117-158)/() 117/59  SpO2:  [93 %-98 %] 95 %  I/O last 3 completed shifts:  In: 3350 [P.O.:375; I.V.:1975; IV Piggyback:1000]  Out: -     Constitutional: Pleasant, healthy-appearing gentleman sitting up in bed.  Alert and oriented x3. No acute distress.  Nontoxic.  HEENT: NCAT. EOMI. Moist oral mucosa.  Respiratory: Clear to auscultation bilaterally. No crackles or wheezes.  Cardiovascular: Regular rate and rhythm. No murmur.  GI: Soft, nontender, nondistended. Colostomy noted. Normoactive bowel sounds.   Musculoskeletal: No gross deformities. No peripheral edema.   Neurologic: Alert and oriented x3. No focal neurologic deficits.    Discharge Disposition   Discharged to home  Condition at discharge: Stable    Consultations This Hospital Stay   None    Time Spent on this Encounter   I, Donna Singh MD, personally saw the patient today and spent less  than or equal to 30 minutes discharging this patient.    Discharge Orders      Reason for your hospital stay    Gastroenteritis with acute kidney injury     Follow-up and recommended labs and tests     Follow up with primary care provider, Villa Garland MD, within 7 days for hospital follow-up.  The following labs/tests are recommended: BMP.     Activity    Your activity upon discharge: activity as tolerated     Full Code     Diet    Advance diet as tolerated to a regular diet     Discharge Medications   Current Discharge Medication List      START taking these medications    Details   !! acetaminophen (TYLENOL) 325 MG tablet Take 1-2 tablets (325-650 mg) by mouth every 4 hours as needed for pain    Associated Diagnoses: Nausea vomiting and diarrhea       !! - Potential duplicate medications found. Please discuss with provider.      CONTINUE these medications which have NOT CHANGED    Details   !! acetaminophen (TYLENOL) 500 MG tablet Take 500-1,000 mg by mouth every 6 hours as needed for mild pain      diphenhydrAMINE (BENADRYL) 25 MG tablet Take 25 mg by mouth nightly as needed for itching or allergies      simvastatin (ZOCOR) 20 MG tablet Take 1 tablet (20 mg) by mouth daily  Qty: 90 tablet, Refills: 3    Associated Diagnoses: Hyperlipidemia LDL goal <130      triamcinolone (KENALOG) 0.1 % cream APPLY TO RASH TID PRN  Qty: 60 g, Refills: 3    Associated Diagnoses: Atopic dermatitis, unspecified type       !! - Potential duplicate medications found. Please discuss with provider.      STOP taking these medications       ibuprofen (ADVIL/MOTRIN) 200 MG tablet Comments:   Reason for Stopping:             Allergies   Allergies   Allergen Reactions     Sulfa Drugs      rash, swelling     Data   Most Recent 3 CBC's:  Recent Labs   Lab Test 10/17/19  0646 10/16/19  1028   WBC 6.7 11.8*   HGB 14.6 18.4*   MCV 90 88    325      Most Recent 3 BMP's:  Recent Labs   Lab Test 10/17/19  0646 10/16/19  1028 12/11/18  0905     130* 141   POTASSIUM 4.3 3.9 4.1   CHLORIDE 112* 98 108   CO2 22 24 25   BUN 16 21 12   CR 1.27* 1.70* 1.23   ANIONGAP 4 8 8   AL 8.4* 10.2* 9.4   GLC 99 125* 97     Most Recent 2 LFT's:  Recent Labs   Lab Test 10/16/19  1028 12/11/18  0905   AST 40 36   ALT 49 52   ALKPHOS 99 52   BILITOTAL 0.8 0.8     Most Recent INR's and Anticoagulation Dosing History:  Anticoagulation Dose History     Recent Dosing and Labs Latest Ref Rng & Units 3/18/2006 3/20/2006 3/27/2006 4/14/2006 4/18/2006 5/18/2006 10/5/2012    INR 0.86 - 1.14 1.15(H) 1.06 1.05 1.02 1.04 0.97 1.20(H)        Most Recent 3 Troponin's:No lab results found.  Most Recent Cholesterol Panel:  Recent Labs   Lab Test 12/11/18  0905   CHOL 147   LDL 82   HDL 44   TRIG 104     Most Recent 6 Bacteria Isolates From Any Culture (See EPIC Reports for Culture Details):No lab results found.  Most Recent TSH, T4 and A1c Labs:No lab results found.  Results for orders placed or performed during the hospital encounter of 11/16/16   US Lower Extremity Venous Duplex Right    Narrative    VENOUS ULTRASOUND RIGHT LOWER EXTREMITY  11/16/2016 11:32 AM     HISTORY: Acute embolism and thrombosis of unspecified deep veins of  right lower extremity.    COMPARISON: October 23, 2016.    TECHNIQUE: Color Doppler and spectral waveform analysis performed  throughout the deep veins of the left lower extremity.    FINDINGS: The right external iliac, common femoral, proximal greater  saphenous, femoral, popliteal, posterior tibial, and left common  femoral veins demonstrate normal blood flow, compression, and  augmentation. Nonocclusive thrombus noted in one of two gastrocnemius  veins.      Impression    IMPRESSION:  1. Nonocclusive thrombus in one of two gastrocnemius veins of the  right calf. This appears in a relatively short segment and is likely  relatively unchanged from previous exam, though difficult to assess  for full extent of previous exam.  2. No evidence of new or  acute DVT.    THOM LEVINE MD

## 2019-10-17 NOTE — PLAN OF CARE
"PRIMARY DIAGNOSIS: Diarrhea     OUTPATIENT/OBSERVATION GOALS TO BE MET BEFORE DISCHARGE  1. Orthostatic performed: No    2. Tolerating PO medications: Yes    3. Return to near baseline physical activity: Yes    4. Cleared for discharge by consultants (if involved): No    Discharge Planner Nurse   Safe discharge environment identified: Yes  Barriers to discharge: Yes       Entered by: Mirna Wiggins 10/17/2019 9:04 AM     Please review provider order for any additional goals.   Nurse to notify provider when observation goals have been met and patient is ready for discharge.    Patient is alert and oriented x4. VS WNL and documented on the FS. Lung sounds clear in all lobes and patient is on RA. Denies SOB. Ilostomy is patent and per patient statement he has only emptied 1 time. Patient states stool has slowed down. Denies any pain, urgency, and frequency when voiding. IV fluids infusing at 125 ml/hr. Regular diet. C-diff and enteric negative. Patient is a SBA when up. Denies pain.     /59 (BP Location: Left arm)   Pulse 83   Temp 97.2  F (36.2  C) (Oral)   Resp 16   Ht 1.803 m (5' 11\")   Wt 110.8 kg (244 lb 4.3 oz)   SpO2 95%   BMI 34.07 kg/m        "

## 2019-10-17 NOTE — PLAN OF CARE
PRIMARY DIAGNOSIS: GASTROENTERITIS    OUTPATIENT/OBSERVATION GOALS TO BE MET BEFORE DISCHARGE  1. Orthostatic performed: No    2. Tolerating PO fluid and/or antibiotics (if applicable): Yes    3. Nausea/Vomiting/Diarrhea symptoms improved: No,  watery, loose, and green    4. Pain status: Pain free.    5. Return to near baseline physical activity: Yes    Pt is A&Ox4. VSS. Pt denies any pain or nausea. Continues with watery output from ileostomy. Pt reports that output is slowing down but not back to baseline. Takes care of his own ostomy. Ind in room. IVF. C diff negative, enteric pending. Pt advanced to regular, tolerated well. Will continue to monitor.     Discharge Planner Nurse   Safe discharge environment identified: Yes  Barriers to discharge: No       Entered by: Whitney Freeman 10/17/2019 1:24 AM     Please review provider order for any additional goals.   Nurse to notify provider when observation goals have been met and patient is ready for discharge.

## 2019-10-18 ENCOUNTER — TELEPHONE (OUTPATIENT)
Dept: PEDIATRICS | Facility: CLINIC | Age: 59
End: 2019-10-18

## 2019-10-18 NOTE — TELEPHONE ENCOUNTER
ED Notes:  Follow up with primary care provider, Villa Garland MD, within 7 days for hospital follow-up.  The following labs/tests are recommended: BMP.    ED / Discharge Outreach Protocol    Patient Contact    Attempt # 1    Was call answered?  No.  Left message on voicemail with information to call me back.    Elvira, RN  Triage Nurse

## 2019-10-18 NOTE — TELEPHONE ENCOUNTER
"Routing to  as a FYI.    ED/Discharge Protocol    \"Hi, my name is Hortencia Lam, RN, a registered nurse, and I am calling on behalf of Dr. Garland's office at [place].  I am calling to follow up and see how things are going for you after your recent visit.\"    \"I see that you were in the (ER/UC/IP) on 10/16/19.    How are you doing now that you are home?\" Feeling better, vomiting only lasted one day while in ED. Vomiting, nausea & diarrhea has subsided. Able to keep food & fluids down, appetite is slowly coming back. Denies abdominal pain. Has mild to moderate itching in below the abdomen randomly, lasting only for minutes. Taking benadryl helps. Denies redness, swelling, spreading rash, skin discoloration, trouble urination, dark urine or jaundice sx's. Advised to continue pushing fluids, bland diet, benadryl prn & monitor closely. If his sx's get worse or with any new sx's, advised to notify us. Pt agrees to the plan.      Is patient experiencing symptoms that may require a hospital visit?  no    Discharge Instructions    \"Let's review your discharge instructions.  What is/are the follow-up recommendations?  Pt. Response: See above    \"Were you instructed to make a follow-up appointment?\"  Pt. Response: Yes.  Has appointment been made?   Yes      \"When you see the provider, I would recommend that you bring your discharge instructions with you.    Medications    \"How many new medications are you on since your hospitalization/ED visit?\"    0-1  \"How many of your current medicines changed (dose, timing, name, etc.) while you were in the hospital/ED visit?\"   0-1  \"Do you have questions about your medications?\"   No  \"Were you newly diagnosed with heart failure, COPD, diabetes or did you have a heart attack?\"   No  For patients on insulin: \"Did you start on insulin in the hospital or did you have your insulin dose changed?\"   No  Post Discharge Medication Reconciliation Status: discharge medications " "reconciled, continue medications without change.    Was MTM referral placed (*Make sure to put transitions as reason for referral)?   No    Call Summary    \"Do you have any questions or concerns about your condition or care plan at the moment?\"    No  Triage nurse advice given: Call us back with any questions/concerns    Patient was in ER 0 in the past year (assess appropriateness of ER visits.)      \"If you have questions or things don't continue to improve, we encourage you contact us through the main clinic number,  [phone].  Even if the clinic is not open, triage nurses are available 24/7 to help you.     We would like you to know that our clinic has extended hours (provide information).  We also have urgent care (provide details on closest location and hours/contact info)\"      \"Thank you for your time and take care!\"    Elvira, RN  Triage Nurse          "

## 2019-10-18 NOTE — TELEPHONE ENCOUNTER
Please contact patient for In-patient follow up.  [phone] (home)     Visit date: [id number]  Diagnosis listed: Nausea Vomiting And Diarrhea  Number of visits in past 12 months: 0 ED / 0 IP

## 2019-10-25 ENCOUNTER — DOCUMENTATION ONLY (OUTPATIENT)
Dept: PEDIATRICS | Facility: CLINIC | Age: 59
End: 2019-10-25

## 2019-10-25 DIAGNOSIS — Z12.5 SCREENING FOR PROSTATE CANCER: Primary | ICD-10-CM

## 2019-10-25 DIAGNOSIS — E78.5 HYPERLIPIDEMIA LDL GOAL <130: ICD-10-CM

## 2019-10-28 DIAGNOSIS — Z12.5 SCREENING FOR PROSTATE CANCER: ICD-10-CM

## 2019-10-28 DIAGNOSIS — E78.5 HYPERLIPIDEMIA LDL GOAL <130: ICD-10-CM

## 2019-10-28 LAB
ANION GAP SERPL CALCULATED.3IONS-SCNC: 3 MMOL/L (ref 3–14)
BUN SERPL-MCNC: 14 MG/DL (ref 7–30)
CALCIUM SERPL-MCNC: 8.9 MG/DL (ref 8.5–10.1)
CHLORIDE SERPL-SCNC: 109 MMOL/L (ref 94–109)
CHOLEST SERPL-MCNC: 181 MG/DL
CO2 SERPL-SCNC: 28 MMOL/L (ref 20–32)
CREAT SERPL-MCNC: 1.28 MG/DL (ref 0.66–1.25)
GFR SERPL CREATININE-BSD FRML MDRD: 60 ML/MIN/{1.73_M2}
GLUCOSE SERPL-MCNC: 117 MG/DL (ref 70–99)
HDLC SERPL-MCNC: 44 MG/DL
LDLC SERPL CALC-MCNC: 108 MG/DL
NONHDLC SERPL-MCNC: 137 MG/DL
POTASSIUM SERPL-SCNC: 4.4 MMOL/L (ref 3.4–5.3)
PSA SERPL-ACNC: 0.4 UG/L (ref 0–4)
SODIUM SERPL-SCNC: 140 MMOL/L (ref 133–144)
TRIGL SERPL-MCNC: 147 MG/DL

## 2019-10-28 PROCEDURE — 36415 COLL VENOUS BLD VENIPUNCTURE: CPT | Performed by: INTERNAL MEDICINE

## 2019-10-28 PROCEDURE — 80048 BASIC METABOLIC PNL TOTAL CA: CPT | Performed by: INTERNAL MEDICINE

## 2019-10-28 PROCEDURE — 80061 LIPID PANEL: CPT | Performed by: INTERNAL MEDICINE

## 2019-10-28 PROCEDURE — G0103 PSA SCREENING: HCPCS | Performed by: INTERNAL MEDICINE

## 2019-11-12 ENCOUNTER — OFFICE VISIT (OUTPATIENT)
Dept: PEDIATRICS | Facility: CLINIC | Age: 59
End: 2019-11-12
Payer: COMMERCIAL

## 2019-11-12 VITALS
WEIGHT: 248 LBS | DIASTOLIC BLOOD PRESSURE: 84 MMHG | OXYGEN SATURATION: 97 % | SYSTOLIC BLOOD PRESSURE: 132 MMHG | TEMPERATURE: 98.1 F | BODY MASS INDEX: 34.72 KG/M2 | RESPIRATION RATE: 16 BRPM | HEIGHT: 71 IN | HEART RATE: 75 BPM

## 2019-11-12 DIAGNOSIS — K21.9 GASTROESOPHAGEAL REFLUX DISEASE WITHOUT ESOPHAGITIS: ICD-10-CM

## 2019-11-12 DIAGNOSIS — L85.3 XEROSIS CUTIS: ICD-10-CM

## 2019-11-12 DIAGNOSIS — L29.9 PRURITUS: Primary | ICD-10-CM

## 2019-11-12 PROCEDURE — 36415 COLL VENOUS BLD VENIPUNCTURE: CPT | Performed by: INTERNAL MEDICINE

## 2019-11-12 PROCEDURE — 80053 COMPREHEN METABOLIC PANEL: CPT | Performed by: INTERNAL MEDICINE

## 2019-11-12 PROCEDURE — 99214 OFFICE O/P EST MOD 30 MIN: CPT | Performed by: INTERNAL MEDICINE

## 2019-11-12 RX ORDER — TRIAMCINOLONE ACETONIDE 1 MG/G
CREAM TOPICAL 2 TIMES DAILY PRN
Qty: 80 G | Refills: 3 | Status: SHIPPED | OUTPATIENT
Start: 2019-11-12 | End: 2020-11-11

## 2019-11-12 ASSESSMENT — MIFFLIN-ST. JEOR: SCORE: 1952.05

## 2019-11-12 NOTE — PROGRESS NOTES
"Subjective     Anam Wolf is a 59 year old male who presents to clinic today for the following health issues:    HPI   itching    Duration: on and off for a month  Description (location/character/radiation): both hands and feet  Intensity: moderate  Accompanying signs and symptoms:   History (similar episodes/previous evaluation): 59-year-old male presents with complaints of moderate generalized itching primarily involving the hands feet and ankles.  However does admit to generalized pruritus as well/symptoms have been present for about 1-2 months.  Medical history is significant for ulcerative colitis ultimately requiring total colectomy and ileostomy.  No known history of associated liver disease.  Patient does admit to intermittent problems with dry skin which has been long-standing.  Patient denies recent changes in detergents, no known exposure to allergens.  Has tried topical moisturizer cream without much improvement.   Of note, daughter recently recommended he start using a topical cream: \"Hempz cream\" in the past 1-2 months/wonders if this might be aggravating symptoms.  Patient does find that 1 or 2 doses of Benadryl alleviates most of his itching.  Precipitating or alleviating factors: None  Therapies tried and outcome: benadryl helps     Questions about acid reflux.  Patient has recently developed acid reflux symptoms associate with certain foods.  With the onset of symptoms, patient has eliminated coffee and caffeinated soda with some improvement.  Tried a short course of Nexium which helped significantly.  Has questions about which over-the-counter medications he can use or if further testing is warranted.    Patient Active Problem List   Diagnosis     HYPERLIPIDEMIA LDL GOAL <130     Status post ileostomy (H)     Atopic dermatitis, unspecified type     Recurrent deep venous thrombosis, unspecified laterality (H)     Diarrhea     Past Surgical History:   Procedure Laterality Date     C REMOVAL " "COLON/PROCTECTOMY/ILEOSTOMY  4/18/2006    Total proctocolectomy with intersphincteric proctectomy and end ileostomy     HC KNEE SCOPE,MED/LAT MENISECTOMY  12/10/2004    Right knee, partial medial meniscectomy and chondroplasty medial femoral condyle     HC REMOVAL OF TONSILS,<13 Y/O       HC REPAIR ING HERNIA,5+Y/O,REDUCIBL         Social History     Tobacco Use     Smoking status: Never Smoker     Smokeless tobacco: Never Used   Substance Use Topics     Alcohol use: Yes     Alcohol/week: 0.0 standard drinks     Comment: rare     Family History   Problem Relation Age of Onset     Lipids Father      C.A.D. Father         MI related to surgery     Cancer - colorectal Father         diagnosis age 56     Diabetes Brother         type 2     Lipids Brother      Prostate Cancer No family hx of      Blood Disease No family hx of         denies family history of venous thromboembolic dz         Current Outpatient Medications   Medication Sig Dispense Refill     acetaminophen (TYLENOL) 325 MG tablet Take 1-2 tablets (325-650 mg) by mouth every 4 hours as needed for pain       diphenhydrAMINE (BENADRYL) 25 MG tablet Take 25 mg by mouth nightly as needed for itching or allergies       simvastatin (ZOCOR) 20 MG tablet Take 1 tablet (20 mg) by mouth daily 90 tablet 3     triamcinolone (KENALOG) 0.1 % cream APPLY TO RASH TID PRN 60 g 3     triamcinolone (KENALOG) 0.1 % external cream Apply topically 2 times daily as needed for irritation 80 g 3       ROS: The following systems have been completely reviewed and are negative except as noted in the HPI: CONSTITUTIONAL, DERMATOLOGIC    OBJECTIVE:                                                    /84 (Cuff Size: Adult Large)   Pulse 75   Temp 98.1  F (36.7  C) (Oral)   Resp 16   Ht 1.803 m (5' 11\")   Wt 112.5 kg (248 lb)   SpO2 97%   BMI 34.59 kg/m   Body mass index is 34.59 kg/m .  GENERAL:  alert,  no distress  NECK: no tenderness, no adenopathy  RESP: lungs clear to " auscultation - no rales, no rhonchi, no wheezes  CV: regular rates and rhythm, normal S1 S2, no S3 or S4 and no murmur, no click or rub   MS: extremities- no edema  Dermatologic: Mild xerotic changes over the ankles bilaterally, dermatologic exam of the hands and feet otherwise relatively normal.     ASSESSMENT/PLAN:                                                        ICD-10-CM    1. Pruritus L29.9 ALLERGY/ASTHMA ADULT REFERRAL     Comprehensive metabolic panel      Generalized pruritus, consider xerosis versus dietary/environmental allergen versus occult biliary disease.  Recommended patient stop Hempz cream.  Xerosis- start Vanicream daily after bathing and apply triamcinolone 0.1% as needed for pruritus.  Diphenhydramine as needed.  Check comprehensive metabolic panel and LFTs today.   If no improvement and labwork normal, recommended visit with Allergy for additional testing     2. Xerosis cutis L85.3 triamcinolone (KENALOG) 0.1 % external cream     3. Gastroesophageal reflux disease without esophagitis K21.9  reflux precautions discussed, recommended OTC omeprazole or ranitidine as needed.        Villa Garland MD  [facility]

## 2019-11-12 NOTE — PATIENT INSTRUCTIONS
Stop Hempz cream  Start vanicream daily after bathing (1-2 times daily)  Triamcinolone as needed for itching  Benadryl as needed  We are checking liver tests, blood sugar    If symptoms fail to improve, schedule visit with Allergy for additional testing

## 2019-11-13 LAB
ALBUMIN SERPL-MCNC: 4.3 G/DL (ref 3.4–5)
ALP SERPL-CCNC: 62 U/L (ref 40–150)
ALT SERPL W P-5'-P-CCNC: 34 U/L (ref 0–70)
ANION GAP SERPL CALCULATED.3IONS-SCNC: 8 MMOL/L (ref 3–14)
AST SERPL W P-5'-P-CCNC: 23 U/L (ref 0–45)
BILIRUB SERPL-MCNC: 0.4 MG/DL (ref 0.2–1.3)
BUN SERPL-MCNC: 9 MG/DL (ref 7–30)
CALCIUM SERPL-MCNC: 8.7 MG/DL (ref 8.5–10.1)
CHLORIDE SERPL-SCNC: 108 MMOL/L (ref 94–109)
CO2 SERPL-SCNC: 23 MMOL/L (ref 20–32)
CREAT SERPL-MCNC: 0.99 MG/DL (ref 0.66–1.25)
GFR SERPL CREATININE-BSD FRML MDRD: 81 ML/MIN/{1.73_M2}
GLUCOSE SERPL-MCNC: 99 MG/DL (ref 70–99)
POTASSIUM SERPL-SCNC: 3.8 MMOL/L (ref 3.4–5.3)
PROT SERPL-MCNC: 7 G/DL (ref 6.8–8.8)
SODIUM SERPL-SCNC: 139 MMOL/L (ref 133–144)

## 2019-11-27 ENCOUNTER — TELEPHONE (OUTPATIENT)
Dept: PEDIATRICS | Facility: CLINIC | Age: 59
End: 2019-11-27

## 2019-11-27 NOTE — TELEPHONE ENCOUNTER
Panel Management Review      Patient has the following on his problem list:       Composite cancer screening  Chart review shows that this patient is due/due soon for the following Colonoscopy  Summary:    Patient is due/failing the following:   COLONOSCOPY    Action needed:   Called patient to ask if he has had a Colonoscopy done in the past, his name pulled up on our report hes due. Thank you.    Type of outreach:    Phone, left message for patient to call back.     Questions for provider review:    None                                                                                                                                    Chaparrita Squires CMA on 11/27/2019 at 11:37 AM

## 2019-12-06 DIAGNOSIS — E78.5 HYPERLIPIDEMIA LDL GOAL <130: ICD-10-CM

## 2019-12-06 RX ORDER — SIMVASTATIN 20 MG
TABLET ORAL
Qty: 90 TABLET | Refills: 3 | Status: SHIPPED | OUTPATIENT
Start: 2019-12-06 | End: 2020-09-28

## 2019-12-06 NOTE — TELEPHONE ENCOUNTER
"    Last Written Prescription Date:  12/11/18  Last Fill Quantity: 90,  # refills: 3   Last office visit: 11/12/2019 with prescribing provider:     Future Office Visit:    Requested Prescriptions   Pending Prescriptions Disp Refills     simvastatin (ZOCOR) 20 MG tablet [Pharmacy Med Name: SIMVASTATIN 20MG TABLETS] 90 tablet 0     Sig: TAKE 1 TABLET(20 MG) BY MOUTH DAILY       Statins Protocol Passed - 12/6/2019 10:18 AM        Passed - LDL on file in past 12 months     Recent Labs   Lab Test 10/28/19  0936   *             Passed - No abnormal creatine kinase in past 12 months     No lab results found.             Passed - Recent (12 mo) or future (30 days) visit within the authorizing provider's specialty     Patient has had an office visit with the authorizing provider or a provider within the authorizing providers department within the previous 12 mos or has a future within next 30 days. See \"Patient Info\" tab in inbasket, or \"Choose Columns\" in Meds & Orders section of the refill encounter.              Passed - Medication is active on med list        Passed - Patient is age 18 or older          "

## 2020-03-15 ENCOUNTER — HEALTH MAINTENANCE LETTER (OUTPATIENT)
Age: 60
End: 2020-03-15

## 2020-09-28 DIAGNOSIS — E78.5 HYPERLIPIDEMIA LDL GOAL <130: ICD-10-CM

## 2020-09-29 RX ORDER — SIMVASTATIN 20 MG
20 TABLET ORAL DAILY
Qty: 90 TABLET | Refills: 0 | Status: SHIPPED | OUTPATIENT
Start: 2020-09-29 | End: 2020-11-23

## 2020-09-29 NOTE — TELEPHONE ENCOUNTER
Prescription approved per [facility] Refill Protocol.  Kelin Hoffman RN

## 2020-11-23 DIAGNOSIS — E78.5 HYPERLIPIDEMIA LDL GOAL <130: ICD-10-CM

## 2020-11-23 RX ORDER — SIMVASTATIN 20 MG
20 TABLET ORAL DAILY
Qty: 90 TABLET | Refills: 0 | Status: SHIPPED | OUTPATIENT
Start: 2020-11-23 | End: 2020-12-15

## 2020-11-23 NOTE — TELEPHONE ENCOUNTER
Prescription approved per [facility] Refill Protocol.  Dontrell Solano RN, BSN

## 2020-11-30 ENCOUNTER — OFFICE VISIT (OUTPATIENT)
Dept: PEDIATRICS | Facility: CLINIC | Age: 60
End: 2020-11-30
Payer: COMMERCIAL

## 2020-11-30 VITALS
WEIGHT: 243 LBS | TEMPERATURE: 98.1 F | DIASTOLIC BLOOD PRESSURE: 74 MMHG | OXYGEN SATURATION: 96 % | BODY MASS INDEX: 33.89 KG/M2 | HEART RATE: 77 BPM | SYSTOLIC BLOOD PRESSURE: 138 MMHG

## 2020-11-30 DIAGNOSIS — R68.89 ABNORMAL CLINICAL FINDING: ICD-10-CM

## 2020-11-30 DIAGNOSIS — K14.3 TONGUE COATING: Primary | ICD-10-CM

## 2020-11-30 PROCEDURE — 99213 OFFICE O/P EST LOW 20 MIN: CPT | Performed by: INTERNAL MEDICINE

## 2020-11-30 PROCEDURE — 36415 COLL VENOUS BLD VENIPUNCTURE: CPT | Performed by: INTERNAL MEDICINE

## 2020-11-30 PROCEDURE — 80053 COMPREHEN METABOLIC PANEL: CPT | Performed by: INTERNAL MEDICINE

## 2020-11-30 NOTE — PATIENT INSTRUCTIONS
Start oral antiseptic rinse once daily and brushing surface of tongue once daily  Follow-up in 2 weeks if not better

## 2020-11-30 NOTE — PROGRESS NOTES
Subjective     Anam Wolf is a 60 year old male who presents to clinic today for the following health issues:    Abnormal oral sensation past few months  ?film or coating on tongue  No recent abx   Denies dry mouth or dysphagia or sore throat  No fever chills or URIsx    Patient Active Problem List   Diagnosis     HYPERLIPIDEMIA LDL GOAL <130     Status post ileostomy (H)     Atopic dermatitis, unspecified type     Recurrent deep venous thrombosis, unspecified laterality (H)     Diarrhea     Past Medical History:   Diagnosis Date     Allergic rhinitis, cause unspecified     ragweed     DVT (deep venous thrombosis) (H)      Iatrogenic pulmonary embolism and infarction (H)     right lower extrem DVT with right PE, followed surgery for right knee     Left sided ulcerative (chronic) colitis (H)     initial presentation 2002 with fairly distal dz; flare beginning late 2005, hospitalized 3/2006; CT at that time showing bowel-wall thickening extending from transverse colon distal; refractory to med management, required colectomy 4/2006     Mild intermittent asthma 3/11/2005    prior diagnosis of mod persistent; currently off controller without persistent symptoms       Past Surgical History:   Procedure Laterality Date     C REMOVAL COLON/PROCTECTOMY/ILEOSTOMY  4/18/2006    Total proctocolectomy with intersphincteric proctectomy and end ileostomy     HC KNEE SCOPE,MED/LAT MENISECTOMY  12/10/2004    Right knee, partial medial meniscectomy and chondroplasty medial femoral condyle     HC REMOVAL OF TONSILS,<11 Y/O       HC REPAIR ING HERNIA,5+Y/O,REDUCIBL         Family History   Problem Relation Age of Onset     Lipids Father      C.A.D. Father         MI related to surgery     Cancer - colorectal Father         diagnosis age 56     Diabetes Brother         type 2     Lipids Brother      Prostate Cancer No family hx of      Blood Disease No family hx of         denies family history of venous thromboembolic dz        ALLERGIES     Allergies   Allergen Reactions     Sulfa Drugs      rash, swelling             Objective    /74 (BP Location: Right arm, Patient Position: Sitting)   Pulse 77   Temp 98.1  F (36.7  C) (Tympanic)   Wt 110.2 kg (243 lb)   SpO2 96%   BMI 33.89 kg/m    Body mass index is 33.89 kg/m .  Physical Exam   GENERAL: healthy, alert and no distress  EYES: Eyes grossly normal to inspection, PERRL and conjunctivae and sclerae normal  HENT: OP without erythema, no thrush, faint yellow coating on tongue. buccal mucosa and palate normal  NECK: no adenopathy, no asymmetry, masses, or scars and thyroid normal to palpation    ASSESSMENT:      ICD-10-CM    1. Tongue coating  K14.3 Comprehensive metabolic panel (BMP + Alb, Alk Phos, ALT, AST, Total. Bili, TP)     Patient Instructions     Not c/w thrush or geographic tongue, suspect hairy tongue variant.  Start oral antiseptic rinse once daily and brushing surface of tongue once daily  Follow-up in 2 weeks if not better       Villa Garland MD

## 2020-12-01 LAB
ALBUMIN SERPL-MCNC: 4.2 G/DL (ref 3.4–5)
ALP SERPL-CCNC: 67 U/L (ref 40–150)
ALT SERPL W P-5'-P-CCNC: 44 U/L (ref 0–70)
ANION GAP SERPL CALCULATED.3IONS-SCNC: 5 MMOL/L (ref 3–14)
AST SERPL W P-5'-P-CCNC: 35 U/L (ref 0–45)
BILIRUB SERPL-MCNC: 0.5 MG/DL (ref 0.2–1.3)
BUN SERPL-MCNC: 13 MG/DL (ref 7–30)
CALCIUM SERPL-MCNC: 9.2 MG/DL (ref 8.5–10.1)
CHLORIDE SERPL-SCNC: 108 MMOL/L (ref 94–109)
CO2 SERPL-SCNC: 25 MMOL/L (ref 20–32)
CREAT SERPL-MCNC: 1.02 MG/DL (ref 0.66–1.25)
GFR SERPL CREATININE-BSD FRML MDRD: 78 ML/MIN/{1.73_M2}
GLUCOSE SERPL-MCNC: 122 MG/DL (ref 70–99)
POTASSIUM SERPL-SCNC: 3.4 MMOL/L (ref 3.4–5.3)
PROT SERPL-MCNC: 7.1 G/DL (ref 6.8–8.8)
SODIUM SERPL-SCNC: 138 MMOL/L (ref 133–144)

## 2020-12-14 ASSESSMENT — ENCOUNTER SYMPTOMS
HEADACHES: 0
ARTHRALGIAS: 0
EYE PAIN: 0
CONSTIPATION: 0
PALPITATIONS: 0
HEARTBURN: 0
WEAKNESS: 0
JOINT SWELLING: 0
ABDOMINAL PAIN: 0
DIARRHEA: 0
HEMATOCHEZIA: 0
NAUSEA: 0
HEMATURIA: 0
COUGH: 0
PARESTHESIAS: 0
FEVER: 0
NERVOUS/ANXIOUS: 0
DYSURIA: 0
MYALGIAS: 0
CHILLS: 0
DIZZINESS: 0
FREQUENCY: 0
SORE THROAT: 0
SHORTNESS OF BREATH: 0

## 2020-12-15 ENCOUNTER — OFFICE VISIT (OUTPATIENT)
Dept: PEDIATRICS | Facility: CLINIC | Age: 60
End: 2020-12-15
Payer: COMMERCIAL

## 2020-12-15 VITALS
BODY MASS INDEX: 33.04 KG/M2 | TEMPERATURE: 97.5 F | HEART RATE: 72 BPM | SYSTOLIC BLOOD PRESSURE: 124 MMHG | HEIGHT: 71 IN | OXYGEN SATURATION: 97 % | WEIGHT: 236 LBS | DIASTOLIC BLOOD PRESSURE: 78 MMHG | RESPIRATION RATE: 16 BRPM

## 2020-12-15 DIAGNOSIS — Z93.2 STATUS POST ILEOSTOMY (H): ICD-10-CM

## 2020-12-15 DIAGNOSIS — Z23 NEED FOR DIPHTHERIA-TETANUS-PERTUSSIS (TDAP) VACCINE: ICD-10-CM

## 2020-12-15 DIAGNOSIS — R73.03 PREDIABETES: ICD-10-CM

## 2020-12-15 DIAGNOSIS — R43.8 HYPOGEUSIA: ICD-10-CM

## 2020-12-15 DIAGNOSIS — Z00.00 ROUTINE GENERAL MEDICAL EXAMINATION AT A HEALTH CARE FACILITY: Primary | ICD-10-CM

## 2020-12-15 DIAGNOSIS — E78.5 HYPERLIPIDEMIA LDL GOAL <130: ICD-10-CM

## 2020-12-15 PROBLEM — R19.7 DIARRHEA: Status: RESOLVED | Noted: 2019-10-16 | Resolved: 2020-12-15

## 2020-12-15 LAB — HBA1C MFR BLD: 5.5 % (ref 0–5.6)

## 2020-12-15 PROCEDURE — 90715 TDAP VACCINE 7 YRS/> IM: CPT | Performed by: INTERNAL MEDICINE

## 2020-12-15 PROCEDURE — 36415 COLL VENOUS BLD VENIPUNCTURE: CPT | Performed by: INTERNAL MEDICINE

## 2020-12-15 PROCEDURE — 90471 IMMUNIZATION ADMIN: CPT | Performed by: INTERNAL MEDICINE

## 2020-12-15 PROCEDURE — 99213 OFFICE O/P EST LOW 20 MIN: CPT | Mod: 25 | Performed by: INTERNAL MEDICINE

## 2020-12-15 PROCEDURE — 83036 HEMOGLOBIN GLYCOSYLATED A1C: CPT | Performed by: INTERNAL MEDICINE

## 2020-12-15 PROCEDURE — 99396 PREV VISIT EST AGE 40-64: CPT | Mod: 25 | Performed by: INTERNAL MEDICINE

## 2020-12-15 PROCEDURE — 80061 LIPID PANEL: CPT | Performed by: INTERNAL MEDICINE

## 2020-12-15 RX ORDER — SIMVASTATIN 20 MG
20 TABLET ORAL DAILY
Qty: 90 TABLET | Refills: 3 | Status: SHIPPED | OUTPATIENT
Start: 2020-12-15 | End: 2022-01-06

## 2020-12-15 ASSESSMENT — ENCOUNTER SYMPTOMS
DYSURIA: 0
NERVOUS/ANXIOUS: 0
ARTHRALGIAS: 0
HEMATOCHEZIA: 0
COUGH: 0
SORE THROAT: 0
HEADACHES: 0
EYE PAIN: 0
NAUSEA: 0
DIARRHEA: 0
ABDOMINAL PAIN: 0
PALPITATIONS: 0
CHILLS: 0
FREQUENCY: 0
HEMATURIA: 0
FEVER: 0
PARESTHESIAS: 0
DIZZINESS: 0
HEARTBURN: 0
CONSTIPATION: 0
WEAKNESS: 0
JOINT SWELLING: 0
MYALGIAS: 0
SHORTNESS OF BREATH: 0

## 2020-12-15 ASSESSMENT — MIFFLIN-ST. JEOR: SCORE: 1892.62

## 2020-12-15 NOTE — PROGRESS NOTES
"SUBJECTIVE:   CC: Anam Wolf is an 60 year old male who presents for preventative health visit.       Patient has been advised of split billing requirements and indicates understanding: Yes  Healthy Habits:     Getting at least 3 servings of Calcium per day:  NO    Bi-annual eye exam:  NO    Dental care twice a year:  Yes    Sleep apnea or symptoms of sleep apnea:  None    Diet:  Regular (no restrictions)    Frequency of exercise:  None    Taking medications regularly:  Yes    Medication side effects:  Not applicable    PHQ-2 Total Score: 0    Additional concerns today:  Yes    Additional concerns today.  Recent visit with regard to abnormal tongue sensation.  Instructed to try OTC mouth rinses-patient has had no improvement since last visit.  Describes a \"slick feeling\" on his tongue suggesting numbness/decreased sensation-describes the sensation involving both the tongue and palate.  In addition notes diminished taste.  Both symptoms have been present for at least 3-4 months without any improvement.  Denies any difficulty with speech or swallowing.  Denies headache vision changes or extremity numbness paresthesias or weakness.    Today's PHQ-2 Score:   PHQ-2 ( 1999 [employer]) 12/14/2020   Q1: Little interest or pleasure in doing things 0   Q2: Feeling down, depressed or hopeless 0   PHQ-2 Score 0   Q1: Little interest or pleasure in doing things Not at all   Q2: Feeling down, depressed or hopeless Not at all   PHQ-2 Score 0       Abuse: Current or Past(Physical, Sexual or Emotional)- No  Do you feel safe in your environment? Yes    Have you ever done Advance Care Planning? (For example, a Health Directive, POLST, or a discussion with a medical provider or your loved ones about your wishes): No, advance care planning information given to patient to review.  Patient declined advance care planning discussion at this time.    Social History     Tobacco Use     Smoking status: Never Smoker     Smokeless tobacco: " Never Used   Substance Use Topics     Alcohol use: Yes     Alcohol/week: 0.0 standard drinks     Comment: rare     If you drink alcohol do you typically have >3 drinks per day or >7 drinks per week? No    Alcohol Use 12/15/2020   Prescreen: >3 drinks/day or >7 drinks/week? -   Prescreen: >3 drinks/day or >7 drinks/week? No       Last PSA:   PSA   Date Value Ref Range Status   10/28/2019 0.40 0 - 4 ug/L Final     Comment:     Assay Method:  Chemiluminescence using Siemens Vista analyzer       Reviewed orders with patient. Reviewed health maintenance and updated orders accordingly - Yes      Reviewed and updated as needed this visit by clinical staff  Tobacco  Allergies    Med Hx  Surg Hx  Fam Hx  Soc Hx        Reviewed and updated as needed this visit by Provider                Patient Active Problem List   Diagnosis     HYPERLIPIDEMIA LDL GOAL <130     Status post ileostomy (H)     Atopic dermatitis, unspecified type     Prediabetes     Past Medical History:   Diagnosis Date     Allergic rhinitis, cause unspecified     ragweed     DVT (deep venous thrombosis) (H)      Iatrogenic pulmonary embolism and infarction (H)     right lower extrem DVT with right PE, followed surgery for right knee     Left sided ulcerative (chronic) colitis (H)     initial presentation 2002 with fairly distal dz; flare beginning late 2005, hospitalized 3/2006; CT at that time showing bowel-wall thickening extending from transverse colon distal; refractory to med management, required colectomy 4/2006     Mild intermittent asthma 3/11/2005    prior diagnosis of mod persistent; currently off controller without persistent symptoms       Past Surgical History:   Procedure Laterality Date     C REMOVAL COLON/PROCTECTOMY/ILEOSTOMY  4/18/2006    Total proctocolectomy with intersphincteric proctectomy and end ileostomy     HC KNEE SCOPE,MED/LAT MENISECTOMY  12/10/2004    Right knee, partial medial meniscectomy and chondroplasty medial femoral  "condyle     HC REMOVAL OF TONSILS,<13 Y/O       HC REPAIR ING HERNIA,5+Y/O,REDUCIBL         Family History   Problem Relation Age of Onset     Lipids Father      C.A.D. Father         MI related to surgery     Cancer - colorectal Father         diagnosis age 56     Diabetes Brother         type 2     Lipids Brother      Prostate Cancer No family hx of      Blood Disease No family hx of         denies family history of venous thromboembolic dz       ALLERGIES     Allergies   Allergen Reactions     Sulfa Drugs      rash, swelling         Review of Systems   Constitutional: Negative for chills and fever.   HENT: Negative for congestion, ear pain, hearing loss and sore throat.    Eyes: Negative for pain and visual disturbance.   Respiratory: Negative for cough and shortness of breath.    Cardiovascular: Negative for chest pain, palpitations and peripheral edema.   Gastrointestinal: Negative for abdominal pain, constipation, diarrhea, heartburn, hematochezia and nausea.   Genitourinary: Negative for discharge, dysuria, frequency, genital sores, hematuria, impotence and urgency.   Musculoskeletal: Negative for arthralgias, joint swelling and myalgias.   Skin: Negative for rash.   Neurological: Negative for dizziness, weakness, headaches and paresthesias.   Psychiatric/Behavioral: Negative for mood changes. The patient is not nervous/anxious.          OBJECTIVE:   /78 (Cuff Size: Adult Large)   Pulse 72   Temp 97.5  F (36.4  C) (Tympanic)   Resp 16   Ht 1.803 m (5' 11\")   Wt 107 kg (236 lb)   SpO2 97%   BMI 32.92 kg/m      Physical Exam  GENERAL: healthy, alert and no distress  EYES: Eyes grossly normal to inspection, PERRL and conjunctivae and sclerae normal  HENT: ear canals and TM's normal, nose and mouth without ulcers or lesions  NECK: no adenopathy, no asymmetry, masses, or scars and thyroid normal to palpation  RESP: lungs clear to auscultation - no rales, rhonchi or wheezes  CV: regular rate and " "rhythm, normal S1 S2, no S3 or S4, no murmur, click or rub, no peripheral edema and peripheral pulses strong  ABDOMEN: soft, nontender, no hepatosplenomegaly, no masses and bowel sounds normal, ileostomy RLQ  MS: no gross musculoskeletal defects noted, no edema  SKIN: no suspicious lesions or rashes  NEURO: Normal strength and tone, mentation intact and speech normal  PSYCH: mentation appears normal, affect normal/bright    ASSESSMENT/PLAN:       ICD-10-CM    1. Routine general medical examination at a health care facility  Z00.00 REVIEW OF HEALTH MAINTENANCE PROTOCOL ORDERS     2. Hypogeusia  R43.8 NEUROLOGY ADULT REFERRAL     Hyperalgesia and numbness involving the tongue and oropharynx.  Symptoms for the past 3-4 months.  Without other findings on exam-recommended further evaluation through neurology      3. Prediabetes  R73.03 Hemoglobin A1c       4. Hyperlipidemia LDL goal <130  E78.5 simvastatin (ZOCOR) 20 MG tablet     Lipid panel reflex to direct LDL Fasting      Adequate control.  Continue current medication.       5. Status post ileostomy (H)  Z93.2 Ileostomy post colectomy for UC.  No problems with ostomy care or site     6. Need for diphtheria-tetanus-pertussis (Tdap) vaccine  Z23 TDAP VACCINE (Adacel, Boostrix)  [[id number]]       Patient has been advised of split billing requirements and indicates understanding: Yes  COUNSELING:   Reviewed preventive health counseling, as reflected in patient instructions       Regular exercise       Healthy diet/nutrition       Colon cancer screening       Prostate cancer screening    Estimated body mass index is 32.92 kg/m  as calculated from the following:    Height as of this encounter: 1.803 m (5' 11\").    Weight as of this encounter: 107 kg (236 lb).     Weight management plan: Discussed healthy diet and exercise guidelines    He reports that he has never smoked. He has never used smokeless tobacco.      Counseling Resources:  ATP IV Guidelines  Pooled Cohorts " Equation Calculator  FRAX Risk Assessment  ICSI Preventive Guidelines  Dietary Guidelines for Americans, 2010  [employer]'s MyPlate  ASA Prophylaxis  Lung CA Screening    Villa Garland MD, MD  [facility]

## 2020-12-15 NOTE — PATIENT INSTRUCTIONS
Schedule visit with Dr Chavez (Neurology)    Preventive Health Recommendations  Male Ages 50 - 64    Yearly exam:             See your health care provider every year in order to  o   Review health changes.   o   Discuss preventive care.    o   Review your medicines if your doctor has prescribed any.     Have a cholesterol test every 5 years, or more frequently if you are at risk for high cholesterol/heart disease.     Have a diabetes test (fasting glucose) every three years. If you are at risk for diabetes, you should have this test more often.     Have a colonoscopy at age 50, or have a yearly FIT test (stool test). These exams will check for colon cancer.      Talk with your health care provider about whether or not a prostate cancer screening test (PSA) is right for you.    You should be tested each year for STDs (sexually transmitted diseases), if you re at risk.     Shots: Get a flu shot each year. Get a tetanus shot every 10 years.     Nutrition:    Eat at least 5 servings of fruits and vegetables daily.     Eat whole-grain bread, whole-wheat pasta and brown rice instead of white grains and rice.     Get adequate Calcium and Vitamin D.     Lifestyle    Exercise for at least 150 minutes a week (30 minutes a day, 5 days a week). This will help you control your weight and prevent disease.     Limit alcohol to one drink per day.     No smoking.     Wear sunscreen to prevent skin cancer.     See your dentist every six months for an exam and cleaning.     See your eye doctor every 1 to 2 years.    
Self

## 2020-12-16 LAB
CHOLEST SERPL-MCNC: 148 MG/DL
HDLC SERPL-MCNC: 47 MG/DL
LDLC SERPL CALC-MCNC: 85 MG/DL
NONHDLC SERPL-MCNC: 101 MG/DL
TRIGL SERPL-MCNC: 81 MG/DL

## 2020-12-22 ENCOUNTER — TELEPHONE (OUTPATIENT)
Dept: NEUROLOGY | Facility: CLINIC | Age: 60
End: 2020-12-22

## 2021-10-24 ENCOUNTER — HEALTH MAINTENANCE LETTER (OUTPATIENT)
Age: 61
End: 2021-10-24

## 2022-01-04 SDOH — ECONOMIC STABILITY: INCOME INSECURITY: IN THE LAST 12 MONTHS, WAS THERE A TIME WHEN YOU WERE NOT ABLE TO PAY THE MORTGAGE OR RENT ON TIME?: NO

## 2022-01-04 SDOH — ECONOMIC STABILITY: INCOME INSECURITY: HOW HARD IS IT FOR YOU TO PAY FOR THE VERY BASICS LIKE FOOD, HOUSING, MEDICAL CARE, AND HEATING?: NOT HARD AT ALL

## 2022-01-04 SDOH — HEALTH STABILITY: PHYSICAL HEALTH: ON AVERAGE, HOW MANY DAYS PER WEEK DO YOU ENGAGE IN MODERATE TO STRENUOUS EXERCISE (LIKE A BRISK WALK)?: 1 DAY

## 2022-01-04 SDOH — ECONOMIC STABILITY: TRANSPORTATION INSECURITY
IN THE PAST 12 MONTHS, HAS THE LACK OF TRANSPORTATION KEPT YOU FROM MEDICAL APPOINTMENTS OR FROM GETTING MEDICATIONS?: NO

## 2022-01-04 SDOH — ECONOMIC STABILITY: FOOD INSECURITY: WITHIN THE PAST 12 MONTHS, THE FOOD YOU BOUGHT JUST DIDN'T LAST AND YOU DIDN'T HAVE MONEY TO GET MORE.: NEVER TRUE

## 2022-01-04 SDOH — ECONOMIC STABILITY: TRANSPORTATION INSECURITY
IN THE PAST 12 MONTHS, HAS LACK OF TRANSPORTATION KEPT YOU FROM MEETINGS, WORK, OR FROM GETTING THINGS NEEDED FOR DAILY LIVING?: NO

## 2022-01-04 SDOH — HEALTH STABILITY: PHYSICAL HEALTH: ON AVERAGE, HOW MANY MINUTES DO YOU ENGAGE IN EXERCISE AT THIS LEVEL?: 50 MIN

## 2022-01-04 SDOH — ECONOMIC STABILITY: FOOD INSECURITY: WITHIN THE PAST 12 MONTHS, YOU WORRIED THAT YOUR FOOD WOULD RUN OUT BEFORE YOU GOT MONEY TO BUY MORE.: NEVER TRUE

## 2022-01-04 ASSESSMENT — SOCIAL DETERMINANTS OF HEALTH (SDOH)
HOW OFTEN DO YOU ATTEND CHURCH OR RELIGIOUS SERVICES?: MORE THAN 4 TIMES PER YEAR
IN A TYPICAL WEEK, HOW MANY TIMES DO YOU TALK ON THE PHONE WITH FAMILY, FRIENDS, OR NEIGHBORS?: MORE THAN THREE TIMES A WEEK
HOW OFTEN DO YOU GET TOGETHER WITH FRIENDS OR RELATIVES?: ONCE A WEEK
DO YOU BELONG TO ANY CLUBS OR ORGANIZATIONS SUCH AS CHURCH GROUPS UNIONS, FRATERNAL OR ATHLETIC GROUPS, OR SCHOOL GROUPS?: YES

## 2022-01-04 ASSESSMENT — ENCOUNTER SYMPTOMS
EYE PAIN: 0
MYALGIAS: 0
HEARTBURN: 1
JOINT SWELLING: 0
HEADACHES: 0
SORE THROAT: 0
SHORTNESS OF BREATH: 0
FEVER: 0
COUGH: 0
WEAKNESS: 0
NERVOUS/ANXIOUS: 0
NAUSEA: 0
HEMATOCHEZIA: 0
PARESTHESIAS: 0
HEMATURIA: 0
DIARRHEA: 0
ABDOMINAL PAIN: 0
FREQUENCY: 0
CHILLS: 0
PALPITATIONS: 0
DIZZINESS: 0
ARTHRALGIAS: 0
DYSURIA: 0
CONSTIPATION: 0

## 2022-01-04 ASSESSMENT — LIFESTYLE VARIABLES
HOW MANY STANDARD DRINKS CONTAINING ALCOHOL DO YOU HAVE ON A TYPICAL DAY: 1 OR 2
HOW OFTEN DO YOU HAVE SIX OR MORE DRINKS ON ONE OCCASION: NEVER
HOW OFTEN DO YOU HAVE A DRINK CONTAINING ALCOHOL: MONTHLY OR LESS

## 2022-01-06 ENCOUNTER — OFFICE VISIT (OUTPATIENT)
Dept: PEDIATRICS | Facility: CLINIC | Age: 62
End: 2022-01-06
Payer: COMMERCIAL

## 2022-01-06 VITALS
HEIGHT: 69 IN | HEART RATE: 69 BPM | SYSTOLIC BLOOD PRESSURE: 160 MMHG | WEIGHT: 243.6 LBS | RESPIRATION RATE: 16 BRPM | BODY MASS INDEX: 36.08 KG/M2 | TEMPERATURE: 97 F | DIASTOLIC BLOOD PRESSURE: 90 MMHG | OXYGEN SATURATION: 97 %

## 2022-01-06 DIAGNOSIS — R03.0 ELEVATED BLOOD PRESSURE READING WITHOUT DIAGNOSIS OF HYPERTENSION: ICD-10-CM

## 2022-01-06 DIAGNOSIS — Z93.2 STATUS POST ILEOSTOMY (H): ICD-10-CM

## 2022-01-06 DIAGNOSIS — Z00.00 ROUTINE GENERAL MEDICAL EXAMINATION AT A HEALTH CARE FACILITY: Primary | ICD-10-CM

## 2022-01-06 DIAGNOSIS — E78.5 HYPERLIPIDEMIA LDL GOAL <130: ICD-10-CM

## 2022-01-06 DIAGNOSIS — E66.01 MORBID OBESITY (H): ICD-10-CM

## 2022-01-06 DIAGNOSIS — Z12.5 SCREENING FOR PROSTATE CANCER: ICD-10-CM

## 2022-01-06 LAB
ALBUMIN SERPL-MCNC: 4.1 G/DL (ref 3.4–5)
ALP SERPL-CCNC: 59 U/L (ref 40–150)
ALT SERPL W P-5'-P-CCNC: 38 U/L (ref 0–70)
ANION GAP SERPL CALCULATED.3IONS-SCNC: 3 MMOL/L (ref 3–14)
AST SERPL W P-5'-P-CCNC: 21 U/L (ref 0–45)
BILIRUB SERPL-MCNC: 0.3 MG/DL (ref 0.2–1.3)
BUN SERPL-MCNC: 11 MG/DL (ref 7–30)
CALCIUM SERPL-MCNC: 8.7 MG/DL (ref 8.5–10.1)
CHLORIDE BLD-SCNC: 109 MMOL/L (ref 94–109)
CHOLEST SERPL-MCNC: 174 MG/DL
CO2 SERPL-SCNC: 28 MMOL/L (ref 20–32)
CREAT SERPL-MCNC: 1.2 MG/DL (ref 0.66–1.25)
FASTING STATUS PATIENT QL REPORTED: YES
GFR SERPL CREATININE-BSD FRML MDRD: 68 ML/MIN/1.73M2
GLUCOSE BLD-MCNC: 98 MG/DL (ref 70–99)
HDLC SERPL-MCNC: 47 MG/DL
LDLC SERPL CALC-MCNC: 107 MG/DL
NONHDLC SERPL-MCNC: 127 MG/DL
POTASSIUM BLD-SCNC: 4.5 MMOL/L (ref 3.4–5.3)
PROT SERPL-MCNC: 7.4 G/DL (ref 6.8–8.8)
PSA SERPL-MCNC: 0.51 UG/L (ref 0–4)
SODIUM SERPL-SCNC: 140 MMOL/L (ref 133–144)
TRIGL SERPL-MCNC: 99 MG/DL

## 2022-01-06 PROCEDURE — 80061 LIPID PANEL: CPT | Performed by: INTERNAL MEDICINE

## 2022-01-06 PROCEDURE — 99396 PREV VISIT EST AGE 40-64: CPT | Performed by: INTERNAL MEDICINE

## 2022-01-06 PROCEDURE — 36415 COLL VENOUS BLD VENIPUNCTURE: CPT | Performed by: INTERNAL MEDICINE

## 2022-01-06 PROCEDURE — G0103 PSA SCREENING: HCPCS | Performed by: INTERNAL MEDICINE

## 2022-01-06 PROCEDURE — 80053 COMPREHEN METABOLIC PANEL: CPT | Performed by: INTERNAL MEDICINE

## 2022-01-06 RX ORDER — SIMVASTATIN 20 MG
20 TABLET ORAL DAILY
Qty: 90 TABLET | Refills: 3 | Status: SHIPPED | OUTPATIENT
Start: 2022-01-06 | End: 2023-01-12

## 2022-01-06 ASSESSMENT — ENCOUNTER SYMPTOMS
FEVER: 0
DIZZINESS: 0
FREQUENCY: 0
HEMATOCHEZIA: 0
MYALGIAS: 0
JOINT SWELLING: 0
CHILLS: 0
CONSTIPATION: 0
HEADACHES: 0
NAUSEA: 0
DIARRHEA: 0
HEARTBURN: 1
HEMATURIA: 0
DYSURIA: 0
ABDOMINAL PAIN: 0
EYE PAIN: 0
ARTHRALGIAS: 0
SORE THROAT: 0
SHORTNESS OF BREATH: 0
NERVOUS/ANXIOUS: 0
PARESTHESIAS: 0
COUGH: 0
WEAKNESS: 0
PALPITATIONS: 0

## 2022-01-06 ASSESSMENT — MIFFLIN-ST. JEOR: SCORE: 1894.3

## 2022-01-06 NOTE — PATIENT INSTRUCTIONS
Please obtain some additional home readings and record over the next few weeks.  Goal blood pressure is less than 140/90.  If your home blood pressure cuff is out of date, the O[employer]ON brand units are reasonable (available on [employer] or at [employer] or [employer]). We generally recommend purchasing an arm cuff automatic unit.        We recommend checking your pressure in the morning after you have been awake for 15 to 30 minutes.  Remember to sit quietly for about 10 minutes before taking the reading and take 2 or 3 readings and take the average.   Please forward your results in the next 2 weeks.     Preventive Health Recommendations  Male Ages 50 - 64    Yearly exam:             See your health care provider every year in order to  o   Review health changes.   o   Discuss preventive care.    o   Review your medicines if your doctor has prescribed any.     Have a cholesterol test every 5 years, or more frequently if you are at risk for high cholesterol/heart disease.     Have a diabetes test (fasting glucose) every three years. If you are at risk for diabetes, you should have this test more often.     Have a colonoscopy at age 50, or have a yearly FIT test (stool test). These exams will check for colon cancer.      Talk with your health care provider about whether or not a prostate cancer screening test (PSA) is right for you.    You should be tested each year for STDs (sexually transmitted diseases), if you re at risk.     Shots: Get a flu shot each year. Get a tetanus shot every 10 years.     Nutrition:    Eat at least 5 servings of fruits and vegetables daily.     Eat whole-grain bread, whole-wheat pasta and brown rice instead of white grains and rice.     Get adequate Calcium and Vitamin D.     Lifestyle    Exercise for at least 150 minutes a week (30 minutes a day, 5 days a week). This will help you control your weight and prevent disease.     Limit alcohol to one drink per day.     No smoking.     Wear sunscreen to  prevent skin cancer.     See your dentist every six months for an exam and cleaning.     See your eye doctor every 1 to 2 years.

## 2022-01-06 NOTE — PROGRESS NOTES
SUBJECTIVE:   CC: Anam Wolf is an 61 year old male who presents for preventative health visit.   Patient has been advised of split billing requirements and indicates understanding: Yes     Healthy Habits:     Getting at least 3 servings of Calcium per day:  NO    Bi-annual eye exam:  NO    Dental care twice a year:  Yes    Sleep apnea or symptoms of sleep apnea:  None    Diet:  Regular (no restrictions)    Frequency of exercise:  1 day/week    Duration of exercise:  Less than 15 minutes    Taking medications regularly:  Yes    Medication side effects:  Not applicable    PHQ-2 Total Score: 0      Today's PHQ-2 Score:   PHQ-2 ( 1999 Pfizer) 1/4/2022   Q1: Little interest or pleasure in doing things 0   Q2: Feeling down, depressed or hopeless 0   PHQ-2 Score 0   PHQ-2 Total Score (12-17 Years)- Positive if 3 or more points; Administer PHQ-A if positive -   Q1: Little interest or pleasure in doing things Not at all   Q2: Feeling down, depressed or hopeless Not at all   PHQ-2 Score 0       Abuse: Current or Past(Physical, Sexual or Emotional)- No  Do you feel safe in your environment? Yes    Social History     Tobacco Use     Smoking status: Never Smoker     Smokeless tobacco: Never Used   Substance Use Topics     Alcohol use: Not Currently     Alcohol/week: 0.0 standard drinks     Comment: rare         Alcohol Use 1/4/2022   Prescreen: >3 drinks/day or >7 drinks/week? Not Applicable   Prescreen: >3 drinks/day or >7 drinks/week? -       Last PSA:   PSA   Date Value Ref Range Status   10/28/2019 0.40 0 - 4 ug/L Final     Comment:     Assay Method:  Chemiluminescence using Siemens Vista analyzer     Prostate Specific Antigen Screen   Date Value Ref Range Status   01/06/2022 0.51 0.00 - 4.00 ug/L Final       Reviewed orders with patient. Reviewed health maintenance and updated orders accordingly - Yes  Patient Active Problem List   Diagnosis     HYPERLIPIDEMIA LDL GOAL <130     Status post ileostomy (H)     Atopic  dermatitis, unspecified type     Prediabetes     Morbid obesity (H)     Past Surgical History:   Procedure Laterality Date     HC KNEE SCOPE,MED/LAT MENISECTOMY  12/10/2004    Right knee, partial medial meniscectomy and chondroplasty medial femoral condyle     HC REMOVAL OF TONSILS,<13 Y/O       HC REPAIR ING HERNIA,5+Y/O,REDUCIBL       ZZC REMOVAL COLON/PROCTECTOMY/ILEOSTOMY  4/18/2006    Total proctocolectomy with intersphincteric proctectomy and end ileostomy       Social History     Tobacco Use     Smoking status: Never Smoker     Smokeless tobacco: Never Used   Substance Use Topics     Alcohol use: Not Currently     Alcohol/week: 0.0 standard drinks     Comment: rare     Family History   Problem Relation Age of Onset     Lipids Father      C.A.D. Father         MI related to surgery     Cancer - colorectal Father         diagnosis age 56     Diabetes Brother         type 2     Lipids Brother      Cerebrovascular Disease Brother      Prostate Cancer No family hx of      Blood Disease No family hx of         denies family history of venous thromboembolic dz         Current Outpatient Medications   Medication Sig Dispense Refill     acetaminophen (TYLENOL) 325 MG tablet Take 1-2 tablets (325-650 mg) by mouth every 4 hours as needed for pain       diphenhydrAMINE (BENADRYL) 25 MG tablet Take 25 mg by mouth nightly as needed for itching or allergies       simvastatin (ZOCOR) 20 MG tablet Take 1 tablet (20 mg) by mouth daily 90 tablet 3     triamcinolone (KENALOG) 0.1 % cream APPLY TO RASH TID PRN 60 g 3       Reviewed and updated as needed this visit by clinical staff  Tobacco  Allergies  Meds   Med Hx  Surg Hx  Fam Hx  Soc Hx       Reviewed and updated as needed this visit by Provider                   Review of Systems   Constitutional: Negative for chills and fever.   HENT: Negative for congestion, ear pain, hearing loss and sore throat.    Eyes: Negative for pain and visual disturbance.   Respiratory:  "Negative for cough and shortness of breath.    Cardiovascular: Negative for chest pain, palpitations and peripheral edema.   Gastrointestinal: Positive for heartburn. Negative for abdominal pain, constipation, diarrhea, hematochezia and nausea.   Genitourinary: Negative for dysuria, frequency, genital sores, hematuria, impotence, penile discharge and urgency.   Musculoskeletal: Negative for arthralgias, joint swelling and myalgias.   Skin: Negative for rash.   Neurological: Negative for dizziness, weakness, headaches and paresthesias.   Psychiatric/Behavioral: Negative for mood changes. The patient is not nervous/anxious.          OBJECTIVE:   BP (!) 160/90 (BP Location: Right arm, Patient Position: Sitting, Cuff Size: Adult Large)   Pulse 69   Temp 97  F (36.1  C) (Tympanic)   Resp 16   Ht 1.759 m (5' 9.25\")   Wt 110.5 kg (243 lb 9.6 oz)   SpO2 97%   BMI 35.71 kg/m      Physical Exam  GENERAL: healthy, alert and no distress  EYES: Eyes grossly normal to inspection, PERRL and conjunctivae and sclerae normal  HENT: ear canals and TM's normal, nose and mouth without ulcers or lesions  NECK: no adenopathy, no asymmetry, masses, or scars and thyroid normal to palpation  RESP: lungs clear to auscultation - no rales, rhonchi or wheezes  CV: regular rate and rhythm, normal S1 S2, no S3 or S4, no murmur, click or rub, no peripheral edema and peripheral pulses strong  ABDOMEN: soft, nontender, no hepatosplenomegaly, no masses and bowel sounds normal     RLQ ostomy  MS: no gross musculoskeletal defects noted, no edema  SKIN: no suspicious lesions or rashes  NEURO: Normal strength and tone, mentation intact and speech normal  PSYCH: mentation appears normal, affect normal/bright      ASSESSMENT/PLAN:   (Z00.00) Routine general medical examination at a health care facility  (primary encounter diagnosis)    (E78.5) Hyperlipidemia LDL goal <130  Comment:   Plan: Lipid panel reflex to direct LDL Fasting,         simvastatin " "(ZOCOR) 20 MG tablet, Comprehensive        metabolic panel (BMP + Alb, Alk Phos, ALT, AST,        Total. Bili, TP)          (Z93.2) Status post ileostomy (H)  Comment:   Plan: h/o prior colectomy for UC.   Stable, continue current cares    (R03.0) Elevated blood pressure reading without diagnosis of hypertension  Comment:   Plan: discussed lifestyle measures: weight loss/reducing sodium and limiting alcohol  Pt will purchase home cuff and start checking home readings and forward results in 2wks    (E66.01) Morbid obesity (H)  Comment:   Plan: weight mgmt discussed    (Z12.5) Screening for prostate cancer  Comment:   Plan: PSA, screen            COUNSELING:   Reviewed preventive health counseling, as reflected in patient instructions       Regular exercise       Healthy diet/nutrition       Prostate cancer screening    Estimated body mass index is 35.71 kg/m  as calculated from the following:    Height as of this encounter: 1.759 m (5' 9.25\").    Weight as of this encounter: 110.5 kg (243 lb 9.6 oz).     Weight management plan: Discussed healthy diet and exercise guidelines    He reports that he has never smoked. He has never used smokeless tobacco.      Counseling Resources:  ATP IV Guidelines  Pooled Cohorts Equation Calculator  FRAX Risk Assessment  ICSI Preventive Guidelines  Dietary Guidelines for Americans, 2010  USDA's MyPlate  ASA Prophylaxis  Lung CA Screening    Villa Garland MD  [facility]  "

## 2022-01-20 ENCOUNTER — MYC MEDICAL ADVICE (OUTPATIENT)
Dept: PEDIATRICS | Facility: CLINIC | Age: 62
End: 2022-01-20
Payer: COMMERCIAL

## 2022-05-09 ENCOUNTER — TELEPHONE (OUTPATIENT)
Dept: PEDIATRICS | Facility: CLINIC | Age: 62
End: 2022-05-09
Payer: COMMERCIAL

## 2022-05-09 NOTE — TELEPHONE ENCOUNTER
"Reason for Call:  Same Day Appointment, Requested Provider:  Villa Garland     PCP: Villa Garland    Reason for visit: bp f/u Dr. Garland wanted to meet w/ pt if pt was still having trouble w/ bp, pt is still having \"quite high\" bp needs f/u a.s.a. p. Please call pt to advise and/or work in.     Duration of symptoms: Past month     Have you been treated for this in the past? Yes    Additional comments: N/A    Can we leave a detailed message on this number? YES    Phone number patient can be reached at: Cell number on file:    Telephone Information:   Mobile [phone]       Best Time: ANY     Call taken on 5/9/2022 at 4:35 PM by Nishi Summers    "

## 2022-05-10 NOTE — TELEPHONE ENCOUNTER
Called patient.   Scheduled for tomorrow.   Patient did not report any red flags symptoms for hypertension.   Reports blood pressure persist in the high 130s/low 140s over 90s.

## 2022-05-11 ENCOUNTER — OFFICE VISIT (OUTPATIENT)
Dept: PEDIATRICS | Facility: CLINIC | Age: 62
End: 2022-05-11
Payer: COMMERCIAL

## 2022-05-11 VITALS
DIASTOLIC BLOOD PRESSURE: 70 MMHG | TEMPERATURE: 97.4 F | RESPIRATION RATE: 16 BRPM | HEART RATE: 72 BPM | WEIGHT: 233 LBS | OXYGEN SATURATION: 97 % | BODY MASS INDEX: 34.16 KG/M2 | SYSTOLIC BLOOD PRESSURE: 136 MMHG

## 2022-05-11 DIAGNOSIS — I10 ESSENTIAL HYPERTENSION: Primary | ICD-10-CM

## 2022-05-11 PROCEDURE — 99213 OFFICE O/P EST LOW 20 MIN: CPT | Performed by: INTERNAL MEDICINE

## 2022-05-11 ASSESSMENT — PAIN SCALES - GENERAL: PAINLEVEL: NO PAIN (0)

## 2022-05-11 NOTE — PROGRESS NOTES
Assessment & Plan     (I10) Essential hypertension  (primary encounter diagnosis)  Comment:   Plan:    Has been able to lose 11-12lbs since last visit  Home readings elevated: 140's/80-90.  Anam would like another 6 months to work on lifestyle changes and additional weight loss, rather than starting antihypertensive  Will RTc in Nov.    Return in 6 months (on 11/11/2022) for follow-up visit.    Villa Garland MD  [facility]    Paulo Mendieta is a 61 year old who presents for the following health issues     HPI     Pt is here for a blood pressure check. Is lower than it was in January when he was seen, but is still on the high side.   Has been working on weight loss    Patient Active Problem List   Diagnosis     HYPERLIPIDEMIA LDL GOAL <130     Status post ileostomy (H)     Atopic dermatitis, unspecified type     Prediabetes     Morbid obesity (H)     Essential hypertension     Current Outpatient Medications   Medication Sig Dispense Refill     acetaminophen (TYLENOL) 325 MG tablet Take 1-2 tablets (325-650 mg) by mouth every 4 hours as needed for pain       diphenhydrAMINE (BENADRYL) 25 MG tablet Take 25 mg by mouth nightly as needed for itching or allergies       simvastatin (ZOCOR) 20 MG tablet Take 1 tablet (20 mg) by mouth daily 90 tablet 3     triamcinolone (KENALOG) 0.1 % cream APPLY TO RASH TID PRN 60 g 3            Objective    /70   Pulse 72   Temp 97.4  F (36.3  C) (Tympanic)   Resp 16   Wt 105.7 kg (233 lb)   SpO2 97%   BMI 34.16 kg/m    Body mass index is 34.16 kg/m .  Physical Exam   GENERAL: healthy, alert and no distress  PSYCH: mentation appears normal, affect normal/bright

## 2022-09-22 ENCOUNTER — OFFICE VISIT (OUTPATIENT)
Dept: PEDIATRICS | Facility: CLINIC | Age: 62
End: 2022-09-22
Payer: COMMERCIAL

## 2022-09-22 VITALS
OXYGEN SATURATION: 95 % | SYSTOLIC BLOOD PRESSURE: 138 MMHG | RESPIRATION RATE: 16 BRPM | WEIGHT: 240.9 LBS | BODY MASS INDEX: 34.49 KG/M2 | HEART RATE: 80 BPM | TEMPERATURE: 97.7 F | HEIGHT: 70 IN | DIASTOLIC BLOOD PRESSURE: 76 MMHG

## 2022-09-22 DIAGNOSIS — L20.9 ATOPIC DERMATITIS, UNSPECIFIED TYPE: ICD-10-CM

## 2022-09-22 DIAGNOSIS — E66.811 OBESITY (BMI 30.0-34.9): ICD-10-CM

## 2022-09-22 DIAGNOSIS — I10 ESSENTIAL HYPERTENSION: Primary | ICD-10-CM

## 2022-09-22 DIAGNOSIS — Z23 NEED FOR VACCINATION: ICD-10-CM

## 2022-09-22 DIAGNOSIS — E78.5 HYPERLIPIDEMIA LDL GOAL <130: ICD-10-CM

## 2022-09-22 PROCEDURE — 90471 IMMUNIZATION ADMIN: CPT | Performed by: NURSE PRACTITIONER

## 2022-09-22 PROCEDURE — 90682 RIV4 VACC RECOMBINANT DNA IM: CPT | Performed by: NURSE PRACTITIONER

## 2022-09-22 PROCEDURE — 99214 OFFICE O/P EST MOD 30 MIN: CPT | Mod: 25 | Performed by: NURSE PRACTITIONER

## 2022-09-22 RX ORDER — TRIAMCINOLONE ACETONIDE 1 MG/G
CREAM TOPICAL
Qty: 60 G | Refills: 3 | Status: SHIPPED | OUTPATIENT
Start: 2022-09-22

## 2022-09-22 RX ORDER — CHLORTHALIDONE 25 MG/1
25 TABLET ORAL DAILY
Qty: 90 TABLET | Refills: 0 | Status: SHIPPED | OUTPATIENT
Start: 2022-09-22 | End: 2022-12-14

## 2022-09-22 ASSESSMENT — PAIN SCALES - GENERAL: PAINLEVEL: MILD PAIN (2)

## 2022-09-22 NOTE — PATIENT INSTRUCTIONS
It was nice seeing you today.    Please let me know if you have any questions regarding today's visit!    Take care,    SOILA Metcalf DNP  Family Medicine
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
## 2022-10-14 ENCOUNTER — TRANSFERRED RECORDS (OUTPATIENT)
Dept: HEALTH INFORMATION MANAGEMENT | Facility: CLINIC | Age: 62
End: 2022-10-14

## 2022-10-25 ENCOUNTER — ALLIED HEALTH/NURSE VISIT (OUTPATIENT)
Dept: PEDIATRICS | Facility: CLINIC | Age: 62
End: 2022-10-25
Payer: COMMERCIAL

## 2022-10-25 VITALS — SYSTOLIC BLOOD PRESSURE: 130 MMHG | DIASTOLIC BLOOD PRESSURE: 76 MMHG

## 2022-10-25 DIAGNOSIS — I10 ESSENTIAL HYPERTENSION: Primary | ICD-10-CM

## 2022-10-25 PROCEDURE — 99207 PR NO CHARGE NURSE ONLY: CPT

## 2022-10-25 NOTE — PROGRESS NOTES
I met with Anam Wolf at the request of Dr Garland to recheck his blood pressure.  Blood pressure medications on the med list were reviewed with patient.    Patient has taken all medications as per usual regimen: Yes  Patient reports tolerating them without any issues or concerns: No    Vitals:    10/25/22 0834   BP: 130/76       Blood pressure was taken, previous encounter was reviewed, recorded blood pressure below 140/90.  Patient was discharged and the note will be sent to the provider for final review.

## 2022-10-25 NOTE — Clinical Note
Pt was here for BP check today, BP was 130/76.  Pt states that he is checking at home, most recent 120/76, states that his BP has been all over the place.  Advised pt to keep doing what he is doing and we will update if any changes.

## 2022-11-02 ENCOUNTER — TELEPHONE (OUTPATIENT)
Dept: PEDIATRICS | Facility: CLINIC | Age: 62
End: 2022-11-02

## 2022-11-02 NOTE — TELEPHONE ENCOUNTER
General Call    Contacts       Type Contact Phone/Fax    11/02/2022 10:12 AM CDT [phone]        Reason for Call:  WRITTEN ORDER    What are your questions or concerns:  Complete form for order    Date of last appointment with provider: 09/22/22    Could we send this information to you in [employer] or would you prefer to receive a phone call?:   No preference   Okay to leave a detailed message?: No

## 2022-11-03 ENCOUNTER — MEDICAL CORRESPONDENCE (OUTPATIENT)
Dept: HEALTH INFORMATION MANAGEMENT | Facility: CLINIC | Age: 62
End: 2022-11-03

## 2022-12-12 DIAGNOSIS — I10 ESSENTIAL HYPERTENSION: ICD-10-CM

## 2022-12-14 RX ORDER — CHLORTHALIDONE 25 MG/1
25 TABLET ORAL DAILY
Qty: 90 TABLET | Refills: 2 | Status: SHIPPED | OUTPATIENT
Start: 2022-12-14 | End: 2023-09-08

## 2022-12-14 NOTE — TELEPHONE ENCOUNTER
Prescription approved per [facility] Refill Protocol.  Ailyn Jensen RN on 12/14/2022 at 11:42 AM

## 2023-01-10 SDOH — ECONOMIC STABILITY: INCOME INSECURITY: HOW HARD IS IT FOR YOU TO PAY FOR THE VERY BASICS LIKE FOOD, HOUSING, MEDICAL CARE, AND HEATING?: NOT HARD AT ALL

## 2023-01-10 SDOH — ECONOMIC STABILITY: INCOME INSECURITY: IN THE LAST 12 MONTHS, WAS THERE A TIME WHEN YOU WERE NOT ABLE TO PAY THE MORTGAGE OR RENT ON TIME?: NO

## 2023-01-10 SDOH — HEALTH STABILITY: PHYSICAL HEALTH: ON AVERAGE, HOW MANY MINUTES DO YOU ENGAGE IN EXERCISE AT THIS LEVEL?: 20 MIN

## 2023-01-10 SDOH — ECONOMIC STABILITY: FOOD INSECURITY: WITHIN THE PAST 12 MONTHS, YOU WORRIED THAT YOUR FOOD WOULD RUN OUT BEFORE YOU GOT MONEY TO BUY MORE.: NEVER TRUE

## 2023-01-10 SDOH — HEALTH STABILITY: PHYSICAL HEALTH: ON AVERAGE, HOW MANY DAYS PER WEEK DO YOU ENGAGE IN MODERATE TO STRENUOUS EXERCISE (LIKE A BRISK WALK)?: 3 DAYS

## 2023-01-10 SDOH — ECONOMIC STABILITY: FOOD INSECURITY: WITHIN THE PAST 12 MONTHS, THE FOOD YOU BOUGHT JUST DIDN'T LAST AND YOU DIDN'T HAVE MONEY TO GET MORE.: NEVER TRUE

## 2023-01-10 ASSESSMENT — ENCOUNTER SYMPTOMS
FEVER: 0
CONSTIPATION: 0
NERVOUS/ANXIOUS: 0
HEMATOCHEZIA: 0
PALPITATIONS: 0
FREQUENCY: 0
NAUSEA: 0
DIARRHEA: 0
CHILLS: 0
HEARTBURN: 0
ABDOMINAL PAIN: 0
JOINT SWELLING: 0
MYALGIAS: 0
WEAKNESS: 0
SHORTNESS OF BREATH: 0
ARTHRALGIAS: 0
PARESTHESIAS: 0
HEMATURIA: 0
DIZZINESS: 0
DYSURIA: 0
SORE THROAT: 0
HEADACHES: 0
EYE PAIN: 0
COUGH: 0

## 2023-01-10 ASSESSMENT — LIFESTYLE VARIABLES
HOW MANY STANDARD DRINKS CONTAINING ALCOHOL DO YOU HAVE ON A TYPICAL DAY: PATIENT DOES NOT DRINK
AUDIT-C TOTAL SCORE: 0
SKIP TO QUESTIONS 9-10: 1
HOW OFTEN DO YOU HAVE SIX OR MORE DRINKS ON ONE OCCASION: NEVER
HOW OFTEN DO YOU HAVE A DRINK CONTAINING ALCOHOL: NEVER

## 2023-01-10 ASSESSMENT — SOCIAL DETERMINANTS OF HEALTH (SDOH)
HOW OFTEN DO YOU GET TOGETHER WITH FRIENDS OR RELATIVES?: ONCE A WEEK
DO YOU BELONG TO ANY CLUBS OR ORGANIZATIONS SUCH AS CHURCH GROUPS UNIONS, FRATERNAL OR ATHLETIC GROUPS, OR SCHOOL GROUPS?: YES
HOW OFTEN DO YOU ATTEND CHURCH OR RELIGIOUS SERVICES?: MORE THAN 4 TIMES PER YEAR
IN A TYPICAL WEEK, HOW MANY TIMES DO YOU TALK ON THE PHONE WITH FAMILY, FRIENDS, OR NEIGHBORS?: MORE THAN THREE TIMES A WEEK

## 2023-01-12 ENCOUNTER — OFFICE VISIT (OUTPATIENT)
Dept: PEDIATRICS | Facility: CLINIC | Age: 63
End: 2023-01-12
Payer: COMMERCIAL

## 2023-01-12 VITALS
SYSTOLIC BLOOD PRESSURE: 132 MMHG | DIASTOLIC BLOOD PRESSURE: 80 MMHG | WEIGHT: 235.1 LBS | HEIGHT: 70 IN | BODY MASS INDEX: 33.66 KG/M2 | OXYGEN SATURATION: 97 % | HEART RATE: 70 BPM | RESPIRATION RATE: 12 BRPM | TEMPERATURE: 97.1 F

## 2023-01-12 DIAGNOSIS — Z00.00 ROUTINE GENERAL MEDICAL EXAMINATION AT A HEALTH CARE FACILITY: Primary | ICD-10-CM

## 2023-01-12 DIAGNOSIS — M50.30 DDD (DEGENERATIVE DISC DISEASE), CERVICAL: ICD-10-CM

## 2023-01-12 DIAGNOSIS — E78.5 HYPERLIPIDEMIA LDL GOAL <130: ICD-10-CM

## 2023-01-12 DIAGNOSIS — R73.03 PREDIABETES: ICD-10-CM

## 2023-01-12 DIAGNOSIS — I10 ESSENTIAL HYPERTENSION: ICD-10-CM

## 2023-01-12 DIAGNOSIS — Z93.2 STATUS POST ILEOSTOMY (H): ICD-10-CM

## 2023-01-12 DIAGNOSIS — E66.01 MORBID OBESITY (H): ICD-10-CM

## 2023-01-12 LAB
ALBUMIN SERPL BCG-MCNC: 4.3 G/DL (ref 3.5–5.2)
ALP SERPL-CCNC: 71 U/L (ref 40–129)
ALT SERPL W P-5'-P-CCNC: 33 U/L (ref 10–50)
ANION GAP SERPL CALCULATED.3IONS-SCNC: 14 MMOL/L (ref 7–15)
AST SERPL W P-5'-P-CCNC: 36 U/L (ref 10–50)
BILIRUB SERPL-MCNC: 0.4 MG/DL
BUN SERPL-MCNC: 16.2 MG/DL (ref 8–23)
CALCIUM SERPL-MCNC: 10.1 MG/DL (ref 8.8–10.2)
CHLORIDE SERPL-SCNC: 102 MMOL/L (ref 98–107)
CHOLEST SERPL-MCNC: 184 MG/DL
CREAT SERPL-MCNC: 1.21 MG/DL (ref 0.67–1.17)
DEPRECATED HCO3 PLAS-SCNC: 27 MMOL/L (ref 22–29)
GFR SERPL CREATININE-BSD FRML MDRD: 67 ML/MIN/1.73M2
GLUCOSE SERPL-MCNC: 107 MG/DL (ref 70–99)
HBA1C MFR BLD: 5.6 % (ref 0–5.6)
HDLC SERPL-MCNC: 46 MG/DL
LDLC SERPL CALC-MCNC: 120 MG/DL
NONHDLC SERPL-MCNC: 138 MG/DL
POTASSIUM SERPL-SCNC: 3.2 MMOL/L (ref 3.4–5.3)
PROT SERPL-MCNC: 7.4 G/DL (ref 6.4–8.3)
SODIUM SERPL-SCNC: 143 MMOL/L (ref 136–145)
TRIGL SERPL-MCNC: 92 MG/DL

## 2023-01-12 PROCEDURE — 80053 COMPREHEN METABOLIC PANEL: CPT | Performed by: INTERNAL MEDICINE

## 2023-01-12 PROCEDURE — 80061 LIPID PANEL: CPT | Performed by: INTERNAL MEDICINE

## 2023-01-12 PROCEDURE — 83036 HEMOGLOBIN GLYCOSYLATED A1C: CPT | Performed by: INTERNAL MEDICINE

## 2023-01-12 PROCEDURE — 99213 OFFICE O/P EST LOW 20 MIN: CPT | Mod: 25 | Performed by: INTERNAL MEDICINE

## 2023-01-12 PROCEDURE — 36415 COLL VENOUS BLD VENIPUNCTURE: CPT | Performed by: INTERNAL MEDICINE

## 2023-01-12 PROCEDURE — 99396 PREV VISIT EST AGE 40-64: CPT | Performed by: INTERNAL MEDICINE

## 2023-01-12 RX ORDER — SIMVASTATIN 20 MG
20 TABLET ORAL DAILY
Qty: 90 TABLET | Refills: 3 | Status: SHIPPED | OUTPATIENT
Start: 2023-01-12 | End: 2023-10-04

## 2023-01-12 ASSESSMENT — ENCOUNTER SYMPTOMS
FEVER: 0
PARESTHESIAS: 0
ARTHRALGIAS: 0
ABDOMINAL PAIN: 0
CHILLS: 0
HEARTBURN: 0
HEADACHES: 0
EYE PAIN: 0
COUGH: 0
NERVOUS/ANXIOUS: 0
WEAKNESS: 0
CONSTIPATION: 0
SORE THROAT: 0
HEMATOCHEZIA: 0
JOINT SWELLING: 0
SHORTNESS OF BREATH: 0
PALPITATIONS: 0
DIZZINESS: 0
FREQUENCY: 0
NAUSEA: 0
MYALGIAS: 0
HEMATURIA: 0
DYSURIA: 0
DIARRHEA: 0

## 2023-01-12 ASSESSMENT — PAIN SCALES - GENERAL: PAINLEVEL: NO PAIN (0)

## 2023-01-12 NOTE — PROGRESS NOTES
SUBJECTIVE:   CC: Anam is an 62 year old who presents for preventative health visit.     Patient has been advised of split billing requirements and indicates understanding: Yes     Healthy Habits:     Getting at least 3 servings of Calcium per day:  NO    Bi-annual eye exam:  NO    Dental care twice a year:  Yes    Sleep apnea or symptoms of sleep apnea:  None    Diet:  Regular (no restrictions)    Frequency of exercise:  2-3 days/week    Duration of exercise:  Less than 15 minutes    Taking medications regularly:  Yes    PHQ-2 Total Score: 0    Additional concerns today:  No      Today's PHQ-2 Score:   PHQ-2 ( 1999 [employer]) 1/10/2023   Q1: Little interest or pleasure in doing things 0   Q2: Feeling down, depressed or hopeless 0   PHQ-2 Score 0   PHQ-2 Total Score (12-17 Years)- Positive if 3 or more points; Administer PHQ-A if positive -   Q1: Little interest or pleasure in doing things Not at all   Q2: Feeling down, depressed or hopeless Not at all   PHQ-2 Score 0         Social History     Tobacco Use     Smoking status: Never     Smokeless tobacco: Never   Substance Use Topics     Alcohol use: Not Currently     Comment: rare         Alcohol Use 1/10/2023   Prescreen: >3 drinks/day or >7 drinks/week? Not Applicable   Prescreen: >3 drinks/day or >7 drinks/week? -       Last PSA:   PSA   Date Value Ref Range Status   10/28/2019 0.40 0 - 4 ug/L Final     Comment:     Assay Method:  Chemiluminescence using Siemens Vista analyzer     Prostate Specific Antigen Screen   Date Value Ref Range Status   01/06/2022 0.51 0.00 - 4.00 ug/L Final       Reviewed orders with patient. Reviewed health maintenance and updated orders accordingly - Yes  Patient Active Problem List   Diagnosis     HYPERLIPIDEMIA LDL GOAL <130     Status post ileostomy (H)     Atopic dermatitis, unspecified type     Prediabetes     Morbid obesity (H)     Essential hypertension     DDD (degenerative disc disease), cervical     Past Surgical History:    Procedure Laterality Date     HC KNEE SCOPE,MED/LAT MENISECTOMY  12/10/2004    Right knee, partial medial meniscectomy and chondroplasty medial femoral condyle     HC REMOVAL OF TONSILS,<11 Y/O       HC REPAIR ING HERNIA,5+Y/O,REDUCIBL       ZZC REMOVAL COLON/PROCTECTOMY/ILEOSTOMY  4/18/2006    Total proctocolectomy with intersphincteric proctectomy and end ileostomy       Social History     Tobacco Use     Smoking status: Never     Smokeless tobacco: Never   Substance Use Topics     Alcohol use: Not Currently     Comment: rare     Family History   Problem Relation Age of Onset     Lipids Father      C.A.D. Father         MI related to surgery     Cancer - colorectal Father         diagnosis age 56     Diabetes Brother         type 2     Lipids Brother      Cerebrovascular Disease Brother      Prostate Cancer No family hx of      Blood Disease No family hx of         denies family history of venous thromboembolic dz         Current Outpatient Medications   Medication Sig Dispense Refill     acetaminophen (TYLENOL) 325 MG tablet Take 1-2 tablets (325-650 mg) by mouth every 4 hours as needed for pain       chlorthalidone (HYGROTON) 25 MG tablet Take 1 tablet (25 mg) by mouth daily 90 tablet 2     diphenhydrAMINE (BENADRYL) 25 MG tablet Take 25 mg by mouth nightly as needed for itching or allergies       simvastatin (ZOCOR) 20 MG tablet Take 1 tablet (20 mg) by mouth daily 90 tablet 3     triamcinolone (KENALOG) 0.1 % external cream APPLY TO RASH TID PRN 60 g 3       Reviewed and updated as needed this visit by clinical staff   Tobacco  Allergies  Meds              Reviewed and updated as needed this visit by Provider                   Review of Systems   Constitutional: Negative for chills and fever.   HENT: Negative for congestion, ear pain, hearing loss and sore throat.    Eyes: Negative for pain and visual disturbance.   Respiratory: Negative for cough and shortness of breath.    Cardiovascular: Negative for  "chest pain, palpitations and peripheral edema.   Gastrointestinal: Negative for abdominal pain, constipation, diarrhea, heartburn, hematochezia and nausea.   Genitourinary: Negative for dysuria, frequency, genital sores, hematuria, impotence and urgency.   Musculoskeletal: Negative for arthralgias, joint swelling and myalgias.   Skin: Negative for rash.   Neurological: Negative for dizziness, weakness, headaches and paresthesias.   Psychiatric/Behavioral: Negative for mood changes. The patient is not nervous/anxious.        OBJECTIVE:   /80 (BP Location: Right arm, Patient Position: Sitting, Cuff Size: Adult Large)   Pulse 70   Temp 97.1  F (36.2  C) (Tympanic)   Resp 12   Ht 1.78 m (5' 10.08\")   Wt 106.6 kg (235 lb 1.6 oz)   SpO2 97%   BMI 33.66 kg/m      Physical Exam  GENERAL: healthy, alert and no distress  EYES: Eyes grossly normal to inspection, PERRL and conjunctivae and sclerae normal  HENT: ear canals and TM's normal, nose and mouth without ulcers or lesions  NECK: no adenopathy, no asymmetry, masses, or scars and thyroid normal to palpation  RESP: lungs clear to auscultation - no rales, rhonchi or wheezes  CV: regular rate and rhythm, normal S1 S2, no S3 or S4, no murmur, click or rub, no peripheral edema and peripheral pulses strong  ABDOMEN: soft, nontender, no hepatosplenomegaly, no masses and bowel sounds normal  MS: no gross musculoskeletal defects noted, no edema  SKIN: no suspicious lesions or rashes  NEURO: Normal strength and tone, mentation intact and speech normal  PSYCH: mentation appears normal, affect normal/bright    ASSESSMENT/PLAN:   (Z00.00) Routine general medical examination at a health care facility  (primary encounter diagnosis)    (R73.03) Prediabetes  Comment:   Plan: Comprehensive metabolic panel (BMP + Alb, Alk         Phos, ALT, AST, Total. Bili, TP)          (I10) Essential hypertension  Comment:   Plan: Adequate control.  Continue current medication.     (E78.5) " Hyperlipidemia LDL goal <130  Comment: Adequate control.  Continue current medication.   Lab Results   Component Value Date     01/06/2022   Plan: Lipid panel reflex to direct LDL Non-fasting,         simvastatin (ZOCOR) 20 MG tablet, Hemoglobin         A1c          (Z93.2) Status post ileostomy (H)  Comment:   Plan: ileostomy following colectomy for UC.   No problems with ostomy    (M50.30) DDD (degenerative disc disease), cervical  Comment:   Plan: hx of neck injury in 2022/ persistent symptoms/following up with [facility]    (E66.01) BMI 33  Comment:   Plan: discussed healthy diet/exercise          COUNSELING:   Reviewed preventive health counseling, as reflected in patient instructions       Regular exercise       Healthy diet/nutrition       Colorectal cancer screening       Prostate cancer screening        He reports that he has never smoked. He has never used smokeless tobacco.            Villa Garland MD  [facility]

## 2023-02-10 DIAGNOSIS — E78.5 HYPERLIPIDEMIA LDL GOAL <130: ICD-10-CM

## 2023-02-10 RX ORDER — SIMVASTATIN 20 MG
TABLET ORAL
Qty: 90 TABLET | Refills: 3
Start: 2023-02-10

## 2023-02-13 ENCOUNTER — TELEPHONE (OUTPATIENT)
Dept: PEDIATRICS | Facility: CLINIC | Age: 63
End: 2023-02-13
Payer: COMMERCIAL

## 2023-02-13 NOTE — TELEPHONE ENCOUNTER
Order/Referral Request    Who is requesting: Handi    Orders being requested: [facility]    Reason service is needed/diagnosis: ileostomy    When are orders needed by: asap    Has this been discussed with Provider: No    Does patient have a preference on a Group/Provider/Facility? no    Does patient have an appointment scheduled?: No    Where to send orders: Fax    Could we send this information to you in [employer] or would you prefer to receive a phone call?:   No preference   Okay to leave a detailed message?: No at Other phone number:  no

## 2023-02-14 ENCOUNTER — MEDICAL CORRESPONDENCE (OUTPATIENT)
Dept: HEALTH INFORMATION MANAGEMENT | Facility: CLINIC | Age: 63
End: 2023-02-14

## 2023-06-19 ENCOUNTER — TRANSFERRED RECORDS (OUTPATIENT)
Dept: HEALTH INFORMATION MANAGEMENT | Facility: CLINIC | Age: 63
End: 2023-06-19
Payer: COMMERCIAL

## 2023-09-06 DIAGNOSIS — I10 ESSENTIAL HYPERTENSION: ICD-10-CM

## 2023-09-08 RX ORDER — CHLORTHALIDONE 25 MG/1
25 TABLET ORAL DAILY
Qty: 90 TABLET | Refills: 3 | Status: SHIPPED | OUTPATIENT
Start: 2023-09-08 | End: 2023-09-18 | Stop reason: SINTOL

## 2023-09-08 NOTE — TELEPHONE ENCOUNTER
Routing refill request to provider for review/approval because:  Labs out of range:  creatinine and K+    Mikala Null RN

## 2023-09-18 ENCOUNTER — HOSPITAL ENCOUNTER (OUTPATIENT)
Dept: ULTRASOUND IMAGING | Facility: CLINIC | Age: 63
Discharge: HOME | End: 2023-09-18
Attending: PHYSICIAN ASSISTANT | Admitting: PHYSICIAN ASSISTANT
Payer: COMMERCIAL

## 2023-09-18 ENCOUNTER — OFFICE VISIT (OUTPATIENT)
Dept: PEDIATRICS | Facility: CLINIC | Age: 63
End: 2023-09-18
Payer: COMMERCIAL

## 2023-09-18 ENCOUNTER — NURSE TRIAGE (OUTPATIENT)
Dept: PEDIATRICS | Facility: CLINIC | Age: 63
End: 2023-09-18

## 2023-09-18 VITALS
SYSTOLIC BLOOD PRESSURE: 144 MMHG | RESPIRATION RATE: 18 BRPM | DIASTOLIC BLOOD PRESSURE: 83 MMHG | WEIGHT: 235 LBS | BODY MASS INDEX: 33.64 KG/M2 | TEMPERATURE: 97.7 F | HEART RATE: 70 BPM | OXYGEN SATURATION: 96 %

## 2023-09-18 DIAGNOSIS — I82.91 RECURRENT THROMBUS: ICD-10-CM

## 2023-09-18 DIAGNOSIS — I80.02 SUPERFICIAL THROMBOPHLEBITIS OF LEFT LEG: Primary | ICD-10-CM

## 2023-09-18 DIAGNOSIS — M79.89 LEFT LEG SWELLING: ICD-10-CM

## 2023-09-18 DIAGNOSIS — E87.6 HYPOKALEMIA: ICD-10-CM

## 2023-09-18 DIAGNOSIS — I10 BENIGN ESSENTIAL HYPERTENSION: ICD-10-CM

## 2023-09-18 LAB
ALBUMIN SERPL BCG-MCNC: 4.4 G/DL (ref 3.5–5.2)
ALP SERPL-CCNC: 58 U/L (ref 40–129)
ALT SERPL W P-5'-P-CCNC: 39 U/L (ref 0–70)
ANION GAP SERPL CALCULATED.3IONS-SCNC: 11 MMOL/L (ref 7–15)
AST SERPL W P-5'-P-CCNC: 33 U/L (ref 0–45)
BILIRUB SERPL-MCNC: 0.7 MG/DL
BUN SERPL-MCNC: 12.4 MG/DL (ref 8–23)
CALCIUM SERPL-MCNC: 9.6 MG/DL (ref 8.8–10.2)
CHLORIDE SERPL-SCNC: 101 MMOL/L (ref 98–107)
CREAT SERPL-MCNC: 1.18 MG/DL (ref 0.67–1.17)
D DIMER PPP FEU-MCNC: 0.86 UG/ML FEU (ref 0–0.5)
DEPRECATED HCO3 PLAS-SCNC: 28 MMOL/L (ref 22–29)
EGFRCR SERPLBLD CKD-EPI 2021: 68 ML/MIN/1.73M2
ERYTHROCYTE [DISTWIDTH] IN BLOOD BY AUTOMATED COUNT: 12.5 % (ref 10–15)
FIBRINOGEN PPP-MCNC: 387 MG/DL (ref 170–490)
GLUCOSE SERPL-MCNC: 104 MG/DL (ref 70–99)
HCT VFR BLD AUTO: 46 % (ref 40–53)
HGB BLD-MCNC: 16 G/DL (ref 13.3–17.7)
MCH RBC QN AUTO: 30.8 PG (ref 26.5–33)
MCHC RBC AUTO-ENTMCNC: 34.8 G/DL (ref 31.5–36.5)
MCV RBC AUTO: 89 FL (ref 78–100)
PLATELET # BLD AUTO: 222 10E3/UL (ref 150–450)
POTASSIUM SERPL-SCNC: 2.9 MMOL/L (ref 3.4–5.3)
PROT SERPL-MCNC: 7 G/DL (ref 6.4–8.3)
RBC # BLD AUTO: 5.19 10E6/UL (ref 4.4–5.9)
SODIUM SERPL-SCNC: 140 MMOL/L (ref 136–145)
WBC # BLD AUTO: 6.9 10E3/UL (ref 4–11)

## 2023-09-18 PROCEDURE — 85303 CLOT INHIBIT PROT C ACTIVITY: CPT | Performed by: PHYSICIAN ASSISTANT

## 2023-09-18 PROCEDURE — 99215 OFFICE O/P EST HI 40 MIN: CPT | Performed by: PHYSICIAN ASSISTANT

## 2023-09-18 PROCEDURE — 85730 THROMBOPLASTIN TIME PARTIAL: CPT | Performed by: PHYSICIAN ASSISTANT

## 2023-09-18 PROCEDURE — 85379 FIBRIN DEGRADATION QUANT: CPT | Performed by: PHYSICIAN ASSISTANT

## 2023-09-18 PROCEDURE — 85390 FIBRINOLYSINS SCREEN I&R: CPT | Performed by: PATHOLOGY

## 2023-09-18 PROCEDURE — 85384 FIBRINOGEN ACTIVITY: CPT | Performed by: PHYSICIAN ASSISTANT

## 2023-09-18 PROCEDURE — 86146 BETA-2 GLYCOPROTEIN ANTIBODY: CPT | Mod: 59 | Performed by: PHYSICIAN ASSISTANT

## 2023-09-18 PROCEDURE — 85027 COMPLETE CBC AUTOMATED: CPT | Performed by: PHYSICIAN ASSISTANT

## 2023-09-18 PROCEDURE — 85306 CLOT INHIBIT PROT S FREE: CPT | Performed by: PHYSICIAN ASSISTANT

## 2023-09-18 PROCEDURE — 80053 COMPREHEN METABOLIC PANEL: CPT | Performed by: PHYSICIAN ASSISTANT

## 2023-09-18 PROCEDURE — 36415 COLL VENOUS BLD VENIPUNCTURE: CPT | Performed by: PHYSICIAN ASSISTANT

## 2023-09-18 PROCEDURE — 85613 RUSSELL VIPER VENOM DILUTED: CPT | Performed by: PHYSICIAN ASSISTANT

## 2023-09-18 PROCEDURE — 93971 EXTREMITY STUDY: CPT | Mod: LT

## 2023-09-18 PROCEDURE — 86147 CARDIOLIPIN ANTIBODY EA IG: CPT | Mod: 59 | Performed by: PHYSICIAN ASSISTANT

## 2023-09-18 PROCEDURE — 85300 ANTITHROMBIN III ACTIVITY: CPT | Performed by: PHYSICIAN ASSISTANT

## 2023-09-18 PROCEDURE — 85240 CLOT FACTOR VIII AHG 1 STAGE: CPT | Performed by: PHYSICIAN ASSISTANT

## 2023-09-18 RX ORDER — ENOXAPARIN SODIUM 150 MG/ML
1 INJECTION SUBCUTANEOUS EVERY 12 HOURS
Qty: 3.2 ML | Refills: 0 | Status: SHIPPED | OUTPATIENT
Start: 2023-09-18 | End: 2023-10-04

## 2023-09-18 RX ORDER — LOSARTAN POTASSIUM 25 MG/1
25 TABLET ORAL DAILY
Qty: 90 TABLET | Refills: 0 | Status: SHIPPED | OUTPATIENT
Start: 2023-09-18 | End: 2023-10-04

## 2023-09-18 RX ORDER — APIXABAN 5 MG (74)
KIT ORAL
Qty: 74 EACH | Refills: 0 | Status: SHIPPED | OUTPATIENT
Start: 2023-09-18 | End: 2023-10-18

## 2023-09-18 NOTE — Clinical Note
Dr. Garland- this patient has a very large superficial GSV occlusive thrombus that meets criteria for DVT therapeutic treatment.  Started today and advised to contact your office for refills.  Extensive hypercoagulability workup completed and referred to hematology to advise on lifelong or short term anticoagulation due to hx of recurrence.  Donna Alcala MBA, MS, PA-C [facility] [place]

## 2023-09-18 NOTE — TELEPHONE ENCOUNTER
"Patient calling and states his left leg is red, swollen from shin bone to ankle, and painful to the touch. Patient has had previous DVTs and PE. Patient is able to walk on affected leg. Patient denies fever, chest pain, shortness of breath or any other red flag symptoms. Advised patient IF any difficulty breathing, or chest pain to call 911 right away.     Disposition: GO TO OFFICE NOW: will consult with [facility] and update patient. Patient does have mandatory work meeting from approximately 11-12 pm today. [facility] will schedule patient for 12:30 pm. Patient agrees to plan of care. [place] [facility] location and phone number given. Patient will call if needing any assistance.     Reason for Disposition   Thigh, calf, or ankle swelling in only one leg    Additional Information   Negative: Chest pain   Negative: Followed an insect bite and has localized swelling (e.g., small area of puffy or swollen skin)   Negative: Followed a knee injury   Negative: Ankle or foot injury   Negative: Pregnant with leg swelling or edema   Negative: Sounds like a life-threatening emergency to the triager   Negative: Difficulty breathing at rest   Negative: Entire foot is cool or blue in comparison to other side   Negative: SEVERE swelling (e.g., swelling extends above knee, entire leg is swollen, weeping fluid)   Negative: Cast on leg or ankle and has increasing pain   Negative: Can't walk or can barely stand (new-onset)   Negative: Fever and red area (or area very tender to touch)   Negative: Patient sounds very sick or weak to the triager   Negative: Swelling of face, arm or hands  (Exception: Slight puffiness of fingers during hot weather.)   Negative: Pregnant 20 or more weeks and sudden weight gain (i.e., > 2 lbs, 1 kg in one week)   Negative: Thigh or calf pain and only 1 side and present > 1 hour    Answer Assessment - Initial Assessment Questions  1. ONSET: \"When did the swelling start?\" (e.g., minutes, hours, days)      Swelling and " "redness started yesterday.  2. LOCATION: \"What part of the leg is swollen?\"  \"Are both legs swollen or just one leg?\"      Just one, left leg; near the shin, radiates down to ankle  3. SEVERITY: \"How bad is the swelling?\" (e.g., localized; mild, moderate, severe)    - Localized: Small area of swelling localized to one leg.    - MILD pedal edema: Swelling limited to foot and ankle, pitting edema < 1/4 inch (6 mm) deep, rest and elevation eliminate most or all swelling.    - MODERATE edema: Swelling of lower leg to knee, pitting edema > 1/4 inch (6 mm) deep, rest and elevation only partially reduce swelling.    - SEVERE edema: Swelling extends above knee, facial or hand swelling present.       moderate  4. REDNESS: \"Does the swelling look red or infected?\"      It is red  5. PAIN: \"Is the swelling painful to touch?\" If Yes, ask: \"How painful is it?\"   (Scale 1-10; mild, moderate or severe)      Painful to touch; 2-3 if not touching area, very painful to touch  6. FEVER: \"Do you have a fever?\" If Yes, ask: \"What is it, how was it measured, and when did it start?\"       No fever  7. CAUSE: \"What do you think is causing the leg swelling?\"      Blood clot   8. MEDICAL HISTORY: \"Do you have a history of blood clots (e.g., DVT), cancer, heart failure, kidney disease, or liver failure?\"      Yes, history of blood clot.   9. RECURRENT SYMPTOM: \"Have you had leg swelling before?\" If Yes, ask: \"When was the last time?\" \"What happened that time?\"      Yes, blood clot in right leg  10. OTHER SYMPTOMS: \"Do you have any other symptoms?\" (e.g., chest pain, difficulty breathing)        No  11. PREGNANCY: \"Is there any chance you are pregnant?\" \"When was your last menstrual period?\"        NA    Protocols used: Leg Swelling and Edema-A-OH      Referral to Acute and Diagnostic Services    [phone] ([place] [address] [facility], [address], [place] [address]    Transition to Acute & Diagnostic Services Clinic has " been discussed with patient, and he agrees with next level of care.   Patient understands that evaluation/treatment at [facility] typically takes significantly longer than in clinic/urgent care (>2 hours).  The [facility] has been contacted by provider/staff to confirm patient acceptance.         Special issues:      None                                Renee Figueredo RN

## 2023-09-18 NOTE — PROGRESS NOTES
Assessment & Plan     Superficial thrombophlebitis of left leg - GSV > 5 cm - 9/18/2023  Recurrent thrombus  Left leg swelling  Stat ultrasound reveals unprovoked extensive superficial thrombophlebitis that is occlusive and measures greater than 5 cm.  Due to this finding and previous DVT and superficial thrombus suspicion for underlying hypercoagulability is increased.  Hypercoagulability work-up will be completed today.  I will start patient on anticoagulation at the therapeutic DVT dosing due to the extent of the superficial clot in the GSV.  Both Eliquis and Xarelto sent to pharmacy so that which ever is preferred by insurance can be utilized.  Both of these starter kits are not available at the pharmacy so we will bridge with Lovenox every 12 hours until able to acquire rivaroxaban or apixaban.  Compression stockings encouraged as well as heat for symptomatic relief.  Avoidance of NSAIDs and aspirin advised.  Follow-up hematology consultation recommended with the [facility] at the [facility].  All questions answered to the patient's satisfaction.  Advised of warning signs and when to seek urgent care.  - Adult Oncology/Hematology  Referral  - Apixaban Starter Pack (ELIQUIS DVT/PE STARTER PACK) 5 MG TBPK  Dispense: 74 each; Refill: 0  - Protein S Antigen Free  - Protein C chromogenic  - Antithrombin III  - Factor 8 assay  - Fibrinogen activity  - Lupus Anticoagulant Panel  - Cardiolipin Maricruz IgG and IgM  - Beta 2 Glycoprotein Antibodies IGG IGM  - D dimer, quantitative  - CBC with platelets  - Comprehensive metabolic panel (BMP + Alb, Alk Phos, ALT, AST, Total. Bili, TP)  - Rivaroxaban ANTICOAGULANT 15 & 20 MG TBPK Starter Therapy Pack  Dispense: 51 each; Refill: 0    Benign essential hypertension  Hypokalemia  Hypokalemia does appear to be a result of initiation of chlorthalidone 9/22/2022 for blood pressure.  Recommend discontinuation of this medication and initiation of  "losartan 25 mg once daily.  Close follow-up in the next 2 weeks for blood pressure recheck and BMP with PCP to advise on further titration of this medication or continuation.  -     losartan (COZAAR) 25 MG tablet; Take 1 tablet (25 mg) by mouth daily  -     PRIMARY CARE FOLLOW-UP SCHEDULING; Future        56 minutes spent by me on the date of the encounter doing chart review, history and exam, documentation and further activities per the note       BMI:   Estimated body mass index is 33.64 kg/m  as calculated from the following:    Height as of 1/12/23: 1.78 m (5' 10.08\").    Weight as of this encounter: 106.6 kg (235 lb).   Weight management plan: Patient was referred to their PCP to discuss a diet and exercise plan.      Return in about 2 weeks (around 10/2/2023) for PCP - to refill medication/recheck labs- hematology - within 3 months to advise on anticoagulation.    Donna Alcala PA-C  [facility] MARTI Mendieta is a 63 year old, presenting for the following health issues:  Leg Pain (Left leg pain/swelling X 2 weeks)         No data to display                HPI     Evaluation for possible DVT  Onset/Duration: X 2 weeks  Description:       Location: left lower extremity        Redness: YES       Pain: 3/10       Warmth: YES       Joint swelling slight near ankle  Progression of symptoms same since yesterday  Accompanying signs and symptoms:       Fevers: no        Numbness/tingling/weakness: no        Chest pain/pleurisy: no        Shortness of breath: no   History        Trauma: no         Recent travel/when: no         Previous history of DVT: YES- DVT X 1 in Right leg, PE post meniscus surgery, X 2 SVT         Family history of DVT: YES- brother stroke noted        Recent surgery: no   Aggravating factors include: standing  Therapies tried and outcome: hot packs on site yesterday producing slight relief   Prior surgery on arteries of veins in this area: No       DVT (deep " venous thrombosis) (H)      Iatrogenic pulmonary embolism and infarction (H)       right lower extrem DVT with right PE, followed surgery for right knee     Oncology note 2016:   Anam comes alone for this clinic visit.  In 2004 he was training for a marathon. He tore a meniscus and needed a procedure. He had a pulmonary embolism about 8 - 10 days after the procedure (arthroscopic meniscectomy) where he presented with chest pain  (noted on CT chest 12/19/2004 - right lower lobe PE) . He was on coumadin for 6 months after which his anticoagulation was discontinued. About 3-4 years ago he had a superficial clot after sitting with the legs crossed all day at conference. He was active and ambulatory time. He was in good health. Most recently he had a clot in October of this year. He again had pain in his heel and calf. It did feel like previous clots and self diagnosed DVT. He went to ED and was seen by Dr. Freeman and followed up with Dr. Garland for his DVT. He was on anticoagulation for 6 months after his DVT in 2012.     I explained risk factors for getting a DVT/PE which include alterations in blood flow/venous stasis, vessel injury and venous stasis. We reviewed a number of etiologies including immobility, inflammation, infections/sepsis, thrombophilia with inherited disorders which fall in one of these 3 categories.      By a rule of thumb anti-coagulation is recommended for period 3-6 months after the provoking factor is gone. If there is no known provoking factor or the risk is persistent (lifelong), then the recommended duration of anticoagulation is life long. This has to be balanced against the risk of bleeding, the burden of anticoagulation and possibly the risk of clotting based on initial thrombosis (small DVT vs massive PE).      Anam ended up having a pulmonary embolism after having a relatively minor knee procedure done.  However, at the time he also had ulcerative colitis and I feel that it might have  contributed to his thrombosis.  He has had complete colectomy done since then about 10 years ago.  Consequently, he has no complaints of diarrhea and does not have any residual effects from his ulcerative colitis.  He ended up having a DVT in the right gastrocnemius vein in the right calf in 2012.  He was again briefly anticoagulated during this time.  He has recently noted pain and swelling in the same leg and was worked up with an ultrasound which has again revealed a clot.  He has been again started on anticoagulation.  His DVTs in 2012 and 2016 have involved the same vein in the same region.  I suspect that both of these represent in the same thrombosis event and the current findings are only reflective of persistent clot from his past.  It is not uncommon to have recurrent issues with edema and sometimes pain in the leg with previous DVT and, more importantly, persistent clot.  It is very hard to distinguish the two and only a baseline ultrasound done between the events could help separate persistent clot from a new venous clot.  However, since he has this new clot in exactly the same location, it is quite possible that this is merely a persistent clot rather than a new thrombotic event.       He wishes to avoid anticoagulation if possible.  He has had recurrent thrombotic events in the form of his pulmonary embolism in 2004 and his DVT in 2012 and possibly recurrence more recently last month.  He will always remain at increased risk of having repeat venous thrombosis.  However, I feel that since his recent clot is possibly not new, we do have the option of not continuing indefinite anticoagulation at this time.  In that situation, I would suggest considering aspirin at 81 mg daily dose.  I have explained to him the differences between arterial and venous thrombosis.  Aspirin and other antiplatelet agents are effective for arterial thrombi as is often seen in coronary artery disease and cerebrovascular disease.   These are not very effective for venous thrombosis, but they are better than not being on any therapy.  As noted earlier, the duration of anticoagulation is determined based on the risk of recurrence and the risk of bleeding.  He is still relatively young with very few comorbidities, if any, and has several decades of life span in front of time.  Over a long period of time, the cumulative risk of bleeding is not trivial.  On the flip side, even a thrombotic event can be life-threatening or even fatal.  He will have to make this determination.  However, if he ends up having a new clot, then very clearly he would be a candidate for long-term anticoagulation.  He notes that he has been already tested for factor V Leiden mutation and prothrombin gene mutation.  In addition, during his episode with pulmonary embolism we could do some additional testing, but essentially that would not help us in decision making a whole lot.  I would not get any evaluation done, as we will also be limited by the ongoing anticoagulation.  I would not set up a followup appointment for him at this point in time but would be more than happy to see him at short notice if he wishes to discuss anything with me.  I will directly communicate with his primary care physician and review my recommendations.          PLAN    Current DVT in the right leg possibly a residual clot from 2012  Agree with the current anticoagulation but he does not have an absolute indication for life long anticoagulation  If he wishes to come off anticoagulation, I would recommend considering ASA daily  I have reviewed the additional risk factors for DVT including immobility, surgeries of hip/knee, chemotherapy/cancers. He will have to be very cautious and cognizant of his symptoms during these times. If he has recurrent DVT, I would recommend life long anti-coagulation  I have reviewed his care with his PCP directly. I would be happy to see him at any time but would not  schedule a follow up at this time. I will see him only as needed or if he has a question.     Review of Systems   Constitutional, HEENT, cardiovascular, pulmonary, GI, , musculoskeletal, neuro, skin, endocrine and psych systems are negative, except as otherwise noted.      Objective    BP (!) 144/83 (BP Location: Right arm, Patient Position: Chair, Cuff Size: Adult Large)   Pulse 70   Temp 97.7  F (36.5  C) (Oral)   Resp 18   Wt 106.6 kg (235 lb)   SpO2 96%   BMI 33.64 kg/m    Body mass index is 33.64 kg/m .  Physical Exam   GENERAL: healthy, alert and no distress  EYES: Eyes grossly normal to inspection  RESP: lungs clear to auscultation - no rales, rhonchi or wheezes  CV: regular rate and rhythm, normal S1 S2, no S3 or S4, no murmur, click or rub, no peripheral edema and peripheral pulses strong  MS: no gross musculoskeletal defects noted, no edema  SKIN: no circumference variation from left or right leg measurements.  Tenderness to distal left shin and inferior aspect of calf.  Well-defined area of erythema on anterior left shin extending to superior aspect of left foot with palpable warmth.  NEURO: Normal strength and tone, mentation intact and speech normal  PSYCH: mentation appears normal, affect normal/bright    Results for orders placed or performed during the hospital encounter of 09/18/23   US Lower Extremity Venous Duplex Left     Status: None    Narrative    [facility]  DATE: 9/18/2023    EXAM: LEFT LOWER EXTREMITY VENOUS ULTRASOUND    INDICATION: Lower extremity swelling/edema.    TECHNIQUE: Duplex imaging was performed utilizing gray-scale,  compression, augmentation as appropriate, color-flow, Doppler waveform  analysis, and spectral Doppler imaging.    COMPARISON: None.    FINDINGS: The left common femoral, profunda femoral, femoral,  popliteal, and segmentally visualized calf veins are patent and  compressible with appropriate vascular waveforms. No deep venous  thrombus is  identified.    There is acute appearing occlusive echogenic thrombus within the left  great saphenous vein extending from the ankle through the proximal  calf. The GSV along the thigh through the saphenofemoral junction is  patent without thrombus.    For comparison, the right common femoral vein is patent and  compressible with normal venous waveforms.      Impression    IMPRESSION:   1. Acute SVT within the below knee left GSV.  2. Negative for left lower extremity DVT.    MAHAD DON MD         SYSTEM ID:  [id number]   Results for orders placed or performed in visit on 09/18/23   Fibrinogen activity     Status: Normal   Result Value Ref Range    Fibrinogen Activity 387 170 - 490 mg/dL   D dimer, quantitative     Status: Abnormal   Result Value Ref Range    D-Dimer Quantitative 0.86 (H) 0.00 - 0.50 ug/mL FEU    Narrative    This D-dimer assay is intended for use in conjunction with a clinical pretest probability assessment model to exclude pulmonary embolism (PE) and deep venous thrombosis (DVT) in outpatients suspected of PE or DVT. The cut-off value is 0.50 ug/mL FEU.    For patients 50 years of age or older, the application of age-adjusted cut-off values for D-Dimer may increase the specificity without significant effect on sensitivity. The literature suggested calculation age adjusted cut-off in ug/L = age in years x 10 ug/L. The results in this laboratory are reported as ug/mL rather than ug/L. The calculation for age adjusted cut off in ug/mL= age in years x 0.01 ug/mL. For example, the cut off for a 76 year old male is 76 x 0.01 ug/mL = 0.76 ug/mL (760 ug/L).    FIORELLA Ren et al. Age adjusted D-dimer cut-off levels to rule out pulmonary embolism: The ADJUST-PE Study. BUSTER 2014;311:6697-7691.; DEEJAY Wolfe et al. Diagnostic accuracy of conventional or age adjusted D-dimer cutoff values in older patients with suspected venous thromboembolism. Systemic review and meta-analysis. BMJ 2013:346:f2492.   CBC with  platelets     Status: Normal   Result Value Ref Range    WBC Count 6.9 4.0 - 11.0 10e3/uL    RBC Count 5.19 4.40 - 5.90 10e6/uL    Hemoglobin 16.0 13.3 - 17.7 g/dL    Hematocrit 46.0 40.0 - 53.0 %    MCV 89 78 - 100 fL    MCH 30.8 26.5 - 33.0 pg    MCHC 34.8 31.5 - 36.5 g/dL    RDW 12.5 10.0 - 15.0 %    Platelet Count 222 150 - 450 10e3/uL   Comprehensive metabolic panel (BMP + Alb, Alk Phos, ALT, AST, Total. Bili, TP)     Status: Abnormal   Result Value Ref Range    Sodium 140 136 - 145 mmol/L    Potassium 2.9 (L) 3.4 - 5.3 mmol/L    Chloride 101 98 - 107 mmol/L    Carbon Dioxide (CO2) 28 22 - 29 mmol/L    Anion Gap 11 7 - 15 mmol/L    Urea Nitrogen 12.4 8.0 - 23.0 mg/dL    Creatinine 1.18 (H) 0.67 - 1.17 mg/dL    Calcium 9.6 8.8 - 10.2 mg/dL    Glucose 104 (H) 70 - 99 mg/dL    Alkaline Phosphatase 58 40 - 129 U/L    AST 33 0 - 45 U/L    ALT 39 0 - 70 U/L    Protein Total 7.0 6.4 - 8.3 g/dL    Albumin 4.4 3.5 - 5.2 g/dL    Bilirubin Total 0.7 <=1.2 mg/dL    GFR Estimate 68 >60 mL/min/1.73m2

## 2023-09-18 NOTE — RESULT ENCOUNTER NOTE
Results discussed directly with patient while patient was present. Any further details documented in the note.   Donna Alcala PA-C

## 2023-09-18 NOTE — LETTER
September 20, 2023      Anam Wolf  [address]  [place] [address]        Dear ,    We are writing to inform you of your test results.    {results letter list:[id number]}    Resulted Orders   Fibrinogen activity   Result Value Ref Range    Fibrinogen Activity 387 170 - 490 mg/dL   D dimer, quantitative   Result Value Ref Range    D-Dimer Quantitative 0.86 (H) 0.00 - 0.50 ug/mL FEU    Narrative    This D-dimer assay is intended for use in conjunction with a clinical pretest probability assessment model to exclude pulmonary embolism (PE) and deep venous thrombosis (DVT) in outpatients suspected of PE or DVT. The cut-off value is 0.50 ug/mL FEU.    For patients 50 years of age or older, the application of age-adjusted cut-off values for D-Dimer may increase the specificity without significant effect on sensitivity. The literature suggested calculation age adjusted cut-off in ug/L = age in years x 10 ug/L. The results in this laboratory are reported as ug/mL rather than ug/L. The calculation for age adjusted cut off in ug/mL= age in years x 0.01 ug/mL. For example, the cut off for a 76 year old male is 76 x 0.01 ug/mL = 0.76 ug/mL (760 ug/L).    M Gela et al. Age adjusted D-dimer cut-off levels to rule out pulmonary embolism: The ADJUST-PE Study. BUSTER 2014;311:7957-8690.; HJ Yaneth et al. Diagnostic accuracy of conventional or age adjusted D-dimer cutoff values in older patients with suspected venous thromboembolism. Systemic review and meta-analysis. BMJ 2013:346:f2492.   CBC with platelets   Result Value Ref Range    WBC Count 6.9 4.0 - 11.0 10e3/uL    RBC Count 5.19 4.40 - 5.90 10e6/uL    Hemoglobin 16.0 13.3 - 17.7 g/dL    Hematocrit 46.0 40.0 - 53.0 %    MCV 89 78 - 100 fL    MCH 30.8 26.5 - 33.0 pg    MCHC 34.8 31.5 - 36.5 g/dL    RDW 12.5 10.0 - 15.0 %    Platelet Count 222 150 - 450 10e3/uL   Comprehensive metabolic panel (BMP + Alb, Alk Phos, ALT, AST, Total. Bili, TP)   Result Value Ref  Range    Sodium 140 136 - 145 mmol/L    Potassium 2.9 (L) 3.4 - 5.3 mmol/L    Chloride 101 98 - 107 mmol/L    Carbon Dioxide (CO2) 28 22 - 29 mmol/L    Anion Gap 11 7 - 15 mmol/L    Urea Nitrogen 12.4 8.0 - 23.0 mg/dL    Creatinine 1.18 (H) 0.67 - 1.17 mg/dL    Calcium 9.6 8.8 - 10.2 mg/dL    Glucose 104 (H) 70 - 99 mg/dL    Alkaline Phosphatase 58 40 - 129 U/L    AST 33 0 - 45 U/L      Comment:      Reference intervals for this test were updated on 6/12/2023 to more accurately reflect our healthy population. There may be differences in the flagging of prior results with similar values performed with this method. Interpretation of those prior results can be made in the context of the updated reference intervals.    ALT 39 0 - 70 U/L      Comment:      Reference intervals for this test were updated on 6/12/2023 to more accurately reflect our healthy population. There may be differences in the flagging of prior results with similar values performed with this method. Interpretation of those prior results can be made in the context of the updated reference intervals.      Protein Total 7.0 6.4 - 8.3 g/dL    Albumin 4.4 3.5 - 5.2 g/dL    Bilirubin Total 0.7 <=1.2 mg/dL    GFR Estimate 68 >60 mL/min/1.73m2       If you have any questions or concerns, please call the clinic at the number listed above.       Sincerely,      Donna Alcala PA-C

## 2023-09-19 LAB
AT III ACT/NOR PPP CHRO: 107 % (ref 85–135)
B2 GLYCOPROT1 IGG SERPL IA-ACNC: 1 U/ML
B2 GLYCOPROT1 IGM SERPL IA-ACNC: 5.7 U/ML
CARDIOLIPIN IGG SER IA-ACNC: 2.7 GPL-U/ML
CARDIOLIPIN IGG SER IA-ACNC: NEGATIVE
CARDIOLIPIN IGM SER IA-ACNC: <2 MPL-U/ML
CARDIOLIPIN IGM SER IA-ACNC: NEGATIVE
DRVVT CONFIRM NORMALIZED RATIO: 1.28
DRVVT SCREEN RATIO: 1.12
FACT VIII ACT/NOR PPP: 160 % (ref 55–200)
INR PPP: 0.94 (ref 0.85–1.15)
LA PPP-IMP: POSITIVE
LUPUS INTERPRETATION: ABNORMAL
PROT S FREE AG ACT/NOR PPP IA: 86 % (ref 70–148)
PTT RATIO: 1.05
THROMBIN TIME: 18.3 SECONDS (ref 13–19)

## 2023-09-19 NOTE — RESULT ENCOUNTER NOTE
Dr. Gill PEREYRA, patient has persistent hypokalemia that correlates with initiation of chlorthalidone for BP 9/22/2022.  I discontinued this medication and replaced it with losartan 25 mg.  Patient has been advised to follow-up with you to recheck blood pressure and renal function/potassium levels.  If you could have your staff assist with scheduling Anam in the next few weeks it would be greatly appreciated.          Aanm  I have reviewed your recent test results:    -Normal red blood cell (hgb) levels, normal white blood cell count and normal platelet levels.  -Liver and gallbladder tests (ALT,AST, Alk phos,bilirubin) are normal.  -Kidney function (GFR) is decreased but stable when compared to levels from the last 2 years.  ADVISE: Continued monitoring with PCP, good control of blood pressure, and avoiding nephrotoxins (NSAIDs - ibuprofen/motrin/aleve)  -Sodium is normal.  -Potassium is decreased, slightly worse than what it was 1/12/23.  Looking back at your medications -your low potassium level correlates with the initiation of the diuretic/water pill/blood pressure medication chlorthalidone 25 mg that you began 9/22/2022.  I propose discontinuation of this medication and replacing it with losartan 25 mg.  A follow-up potassium and kidney function can be done through your PCP to ensure normalization.  I will send this to your pharmacy.  -Calcium is normal.  -Glucose is mildly elevated but you were nonfasting and this is okay.  -D dimer is elevated (this result is expected due to your acute DVT - this was completed as a baseline in the case symptoms did not improve or hematology wanted to recheck it for improvements)    For additional lab test information, www.testing.com is an excellent reference.     If you have any questions please do not hesitate to contact our office via phone ([phone]) or [employer].    Healthy regards,     Donna Alcala MBA, MS, PA-C  [facility]

## 2023-09-20 LAB — PROT C ACT/NOR PPP CHRO: 144 % (ref 70–170)

## 2023-10-04 ENCOUNTER — OFFICE VISIT (OUTPATIENT)
Dept: PEDIATRICS | Facility: CLINIC | Age: 63
End: 2023-10-04
Attending: PHYSICIAN ASSISTANT
Payer: COMMERCIAL

## 2023-10-04 VITALS
OXYGEN SATURATION: 95 % | WEIGHT: 240 LBS | BODY MASS INDEX: 34.36 KG/M2 | TEMPERATURE: 97 F | DIASTOLIC BLOOD PRESSURE: 80 MMHG | RESPIRATION RATE: 16 BRPM | HEART RATE: 67 BPM | SYSTOLIC BLOOD PRESSURE: 124 MMHG

## 2023-10-04 DIAGNOSIS — R76.0 LUPUS ANTICOAGULANT POSITIVE: ICD-10-CM

## 2023-10-04 DIAGNOSIS — E78.5 HYPERLIPIDEMIA LDL GOAL <130: ICD-10-CM

## 2023-10-04 DIAGNOSIS — I82.91 RECURRENT THROMBUS: Primary | ICD-10-CM

## 2023-10-04 DIAGNOSIS — I10 BENIGN ESSENTIAL HYPERTENSION: ICD-10-CM

## 2023-10-04 DIAGNOSIS — E87.6 HYPOKALEMIA: ICD-10-CM

## 2023-10-04 LAB
ANION GAP SERPL CALCULATED.3IONS-SCNC: 9 MMOL/L (ref 7–15)
BUN SERPL-MCNC: 16.6 MG/DL (ref 8–23)
CALCIUM SERPL-MCNC: 9.9 MG/DL (ref 8.8–10.2)
CHLORIDE SERPL-SCNC: 107 MMOL/L (ref 98–107)
CREAT SERPL-MCNC: 1.31 MG/DL (ref 0.67–1.17)
DEPRECATED HCO3 PLAS-SCNC: 25 MMOL/L (ref 22–29)
EGFRCR SERPLBLD CKD-EPI 2021: 60 ML/MIN/1.73M2
GLUCOSE SERPL-MCNC: 95 MG/DL (ref 70–99)
POTASSIUM SERPL-SCNC: 4.7 MMOL/L (ref 3.4–5.3)
SODIUM SERPL-SCNC: 141 MMOL/L (ref 135–145)

## 2023-10-04 PROCEDURE — 99214 OFFICE O/P EST MOD 30 MIN: CPT | Mod: 25 | Performed by: INTERNAL MEDICINE

## 2023-10-04 PROCEDURE — 36415 COLL VENOUS BLD VENIPUNCTURE: CPT | Performed by: INTERNAL MEDICINE

## 2023-10-04 PROCEDURE — 80048 BASIC METABOLIC PNL TOTAL CA: CPT | Performed by: INTERNAL MEDICINE

## 2023-10-04 PROCEDURE — 90471 IMMUNIZATION ADMIN: CPT | Performed by: INTERNAL MEDICINE

## 2023-10-04 PROCEDURE — 90662 IIV NO PRSV INCREASED AG IM: CPT | Performed by: INTERNAL MEDICINE

## 2023-10-04 RX ORDER — SIMVASTATIN 20 MG
20 TABLET ORAL DAILY
Qty: 90 TABLET | Refills: 11 | Status: SHIPPED | OUTPATIENT
Start: 2023-10-04

## 2023-10-04 RX ORDER — LOSARTAN POTASSIUM 25 MG/1
25 TABLET ORAL DAILY
Qty: 90 TABLET | Refills: 11 | Status: SHIPPED | OUTPATIENT
Start: 2023-10-04 | End: 2024-03-13

## 2023-10-04 ASSESSMENT — PAIN SCALES - GENERAL: PAINLEVEL: MODERATE PAIN (4)

## 2023-10-04 NOTE — PROGRESS NOTES
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  Presents for follow-up of recent superficial venous thrombosis left lower extremity.    US 9/18:  IMPRESSION:   1. Acute SVT within the below knee left GSV.  2. Negative for left lower extremity DVT  Has continued on apixaban-continues to have some mild left calf soreness/some improvement.  Recommend continue apixaban/has upcoming follow-up with hematology 10/17 to discuss potential need for long-term anticoagulation.  Recent hypercoagulable lab work reviewed-positive lupus anticoagulant.  Plan: apixaban ANTICOAGULANT (ELIQUIS) 5 MG tablet          (I10) Benign essential hypertension  Comment: Chlorthalidone recently discontinued secondary to hypokalemia.  Blood pressure at goal today--continue losartan  Plan: losartan (COZAAR) 25 MG tablet          (E87.6) Hypokalemia  Comment: Secondary to thiazide diuretic/now discontinued.  Repeat BMP   Plan: Basic metabolic panel  (Ca, Cl, CO2, Creat,         Gluc, K, Na, BUN)          (E78.5) Hyperlipidemia LDL goal <130  Comment: Adequate control.  Continue current simvastatin  Lab Results   Component Value Date     01/12/2023    LDL 85 12/15/2020    Plan: simvastatin (ZOCOR) 20 MG tablet            Villa Garland MD  [facility] KELLI Mendieta is a 63 year old, presenting for the following health issues:  Follow Up        10/4/2023     7:41 AM   Additional Questions   Roomed by Molly ERAZO   Accompanied by Self         10/4/2023     7:41 AM   Patient Reported Additional Medications   Patient reports taking the following new medications n/a       History of Present Illness       Reason for visit:  Blood clots  Symptom onset:  1-2 weeks ago  Symptoms include:  Swelling in calf/ankle  Symptom intensity:  Severe  Symptom  progression:  Improving  Had these symptoms before:  Yes  Has tried/received treatment for these symptoms:  Yes  Previous treatment was successful:  Yes  Prior treatment description:  Clotting meds    He eats 0-1 servings of fruits and vegetables daily.He consumes 2 sweetened beverage(s) daily.He exercises with enough effort to increase his heart rate 10 to 19 minutes per day.  He exercises with enough effort to increase his heart rate 3 or less days per week.   He is taking medications regularly.     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 74 each 0    diphenhydrAMINE (BENADRYL) 25 MG tablet Take 25 mg by mouth nightly as needed for itching or allergies      losartan (COZAAR) 25 MG tablet Take 1 tablet (25 mg) by mouth daily 90 tablet 11    simvastatin (ZOCOR) 20 MG tablet Take 1 tablet (20 mg) by mouth daily 90 tablet 11    triamcinolone (KENALOG) 0.1 % external cream APPLY TO RASH TID PRN 60 g 3        Review of Systems         Objective    /80   Pulse 67   Temp 97  F (36.1  C) (Tympanic)   Resp 16   Wt 108.9 kg (240 lb)   SpO2 95%   BMI 34.36 kg/m    Body mass index is 34.36 kg/m .  Physical Exam    GENERAL: healthy, alert and no distress  EYES: Eyes grossly normal to inspection, PERRL and conjunctivae and sclerae normal  Ext: left LE trace edema, without erythema  SKIN: no suspicious lesions or rashes  PSYCH: mentation appears normal, affect normal/bright
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
## 2023-10-16 ENCOUNTER — TRANSFERRED RECORDS (OUTPATIENT)
Dept: HEALTH INFORMATION MANAGEMENT | Facility: CLINIC | Age: 63
End: 2023-10-16
Payer: COMMERCIAL

## 2023-10-17 ENCOUNTER — OFFICE VISIT (OUTPATIENT)
Dept: HEMATOLOGY | Facility: CLINIC | Age: 63
End: 2023-10-17
Attending: PHYSICIAN ASSISTANT
Payer: COMMERCIAL

## 2023-10-17 VITALS
HEART RATE: 91 BPM | TEMPERATURE: 96.1 F | WEIGHT: 245.9 LBS | OXYGEN SATURATION: 95 % | BODY MASS INDEX: 35.2 KG/M2 | DIASTOLIC BLOOD PRESSURE: 82 MMHG | SYSTOLIC BLOOD PRESSURE: 150 MMHG

## 2023-10-17 DIAGNOSIS — M79.89 LEFT LEG SWELLING: ICD-10-CM

## 2023-10-17 DIAGNOSIS — I80.02 SUPERFICIAL THROMBOPHLEBITIS OF LEFT LEG: ICD-10-CM

## 2023-10-17 DIAGNOSIS — I82.91 RECURRENT THROMBUS: ICD-10-CM

## 2023-10-17 PROCEDURE — 99215 OFFICE O/P EST HI 40 MIN: CPT | Performed by: PHYSICIAN ASSISTANT

## 2023-10-17 PROCEDURE — 99213 OFFICE O/P EST LOW 20 MIN: CPT | Performed by: PHYSICIAN ASSISTANT

## 2023-10-17 NOTE — PATIENT INSTRUCTIONS
[facility]  [facility]  [facility], [place] [address]  Main: [phone], Fax: [phone]    It was a pleasure seeing you today.  Thank you for allowing us to be involved in your care. Please let us know if there is anything else we can do for you, so that we can be sure you are leaving completely satisfied with your care experience.    Please stay on your blood thinner:  Eliquis  Please call us with any bleeding issues that you may have.    We would like you to repeat your imaging in 2 months; you are scheduled at the [facility] in [place] on December 18th with a 1115am check in    Wear compression stockings if you have swelling in your leg. Take them off while you are sleeping. You may need to get new stockings every 3-6 months with regular wear.    Patient Resources:   For additional information, please see the following web link:  [email or web address]    Call the [facility]  at [phone].     -If surgeries or procedures are planned (for holding instructions).     -If off anticoagulation, please call during high risk times (long-distance travel, broken bones or trauma, immobilization, surgery, pregnancy, or taking estrogen).     -Any new symptoms of DVT (deep vein thrombosis) or PE (pulmonary embolism)    -pain     -swelling     -redness    -warmth    -shortness of breath    -chest pain    -coughing up blood    We would like a provider on our team to see you at least annually for optimal care and to allow us to continue to prescribe for you.  Brian Del Rosario PA-C, Ibis Long, MPH, PAGREG  and Hortencia Harrell PA-C are our physician assistants that are specialized in bleeding and clotting disorders that you may be able to see more readily.    Return to clinic on; 12/29 @1pm with Brian Del Rosario PA-C virtually     Your nurse clinician is Macrina Sam RN, BSN, [phone] .   If she is unavailable  and you have immediate concerns, please call [phone] and ask for a nurse.
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
## 2023-12-11 DIAGNOSIS — I82.91 RECURRENT THROMBUS: ICD-10-CM

## 2023-12-11 RX ORDER — APIXABAN 5 MG/1
5 TABLET, FILM COATED ORAL 2 TIMES DAILY
Qty: 120 TABLET | Refills: 0 | Status: SHIPPED | OUTPATIENT
Start: 2023-12-11 | End: 2023-12-29

## 2023-12-11 NOTE — TELEPHONE ENCOUNTER
Patient last seen 10/17/2023 with plans to remain on Eliquis until follow up visit at the end of December. Patient is scheduled for repeat imaging and follow up with Brian as per Brian's clinic note. 1 refill granted to get pt to that appt.    Macrina OSWALDN, RN, PHN   [facility]   Office: [phone]  Fax: [phone]

## 2023-12-18 ENCOUNTER — HOSPITAL ENCOUNTER (OUTPATIENT)
Dept: ULTRASOUND IMAGING | Facility: CLINIC | Age: 63
Discharge: HOME | End: 2023-12-18
Attending: PHYSICIAN ASSISTANT | Admitting: PHYSICIAN ASSISTANT
Payer: COMMERCIAL

## 2023-12-18 DIAGNOSIS — I80.02 SUPERFICIAL THROMBOPHLEBITIS OF LEFT LEG: ICD-10-CM

## 2023-12-18 DIAGNOSIS — M79.89 LEFT LEG SWELLING: ICD-10-CM

## 2023-12-18 PROCEDURE — 93971 EXTREMITY STUDY: CPT | Mod: LT

## 2023-12-29 ENCOUNTER — VIRTUAL VISIT (OUTPATIENT)
Dept: HEMATOLOGY | Facility: CLINIC | Age: 63
End: 2023-12-29
Attending: PHYSICIAN ASSISTANT
Payer: COMMERCIAL

## 2023-12-29 DIAGNOSIS — I82.91 RECURRENT THROMBUS: ICD-10-CM

## 2023-12-29 PROCEDURE — 99214 OFFICE O/P EST MOD 30 MIN: CPT | Mod: VID | Performed by: PHYSICIAN ASSISTANT

## 2023-12-29 NOTE — PROGRESS NOTES
Patient was contacted to complete the pre-visit call prior to their video visit with the provider.      Allergies and medications were reviewed.    I thanked them for their time to cover this information     Noe Yates CMA

## 2023-12-29 NOTE — PATIENT INSTRUCTIONS
Anam,    It was nice to see you via video visit today.    Below is a summary of our plan:  We have decided together that you should remain on indefinite anticoagulation therapy with apixaban (Eliquis) at 5 mg by mouth every 12 hours at this time.   If you have decided to stop taking your anticoagulation therapy, please contact our clinic to schedule an appointment before doing so.   If you decided to stay on indefinite anticoagulation therapy, I will plan to see you back in one year for follow up. Virtual or in person visit is fine.   If you should experience any unusual bleeding issues, or if you should need any invasive or surgical procedures in the future, please call our clinic at [phone] and ask to speak to a nursing staff.     Thank you once again in choosing our clinic as part of your healthcare team.       Brian Del Rosario PA-C, MPAS  Physician Assistant  [facility].

## 2024-03-11 SDOH — HEALTH STABILITY: PHYSICAL HEALTH: ON AVERAGE, HOW MANY MINUTES DO YOU ENGAGE IN EXERCISE AT THIS LEVEL?: 60 MIN

## 2024-03-11 SDOH — HEALTH STABILITY: PHYSICAL HEALTH: ON AVERAGE, HOW MANY DAYS PER WEEK DO YOU ENGAGE IN MODERATE TO STRENUOUS EXERCISE (LIKE A BRISK WALK)?: 3 DAYS

## 2024-03-11 ASSESSMENT — SOCIAL DETERMINANTS OF HEALTH (SDOH): HOW OFTEN DO YOU GET TOGETHER WITH FRIENDS OR RELATIVES?: TWICE A WEEK

## 2024-03-13 ENCOUNTER — OFFICE VISIT (OUTPATIENT)
Dept: PEDIATRICS | Facility: CLINIC | Age: 64
End: 2024-03-13
Payer: COMMERCIAL

## 2024-03-13 VITALS
SYSTOLIC BLOOD PRESSURE: 148 MMHG | OXYGEN SATURATION: 95 % | WEIGHT: 241.2 LBS | TEMPERATURE: 98.6 F | BODY MASS INDEX: 33.77 KG/M2 | HEIGHT: 71 IN | HEART RATE: 67 BPM | RESPIRATION RATE: 16 BRPM | DIASTOLIC BLOOD PRESSURE: 80 MMHG

## 2024-03-13 DIAGNOSIS — R73.03 PREDIABETES: ICD-10-CM

## 2024-03-13 DIAGNOSIS — R76.0 LUPUS ANTICOAGULANT POSITIVE: ICD-10-CM

## 2024-03-13 DIAGNOSIS — Z93.2 STATUS POST ILEOSTOMY (H): ICD-10-CM

## 2024-03-13 DIAGNOSIS — E78.5 HYPERLIPIDEMIA LDL GOAL <130: ICD-10-CM

## 2024-03-13 DIAGNOSIS — I10 BENIGN ESSENTIAL HYPERTENSION: ICD-10-CM

## 2024-03-13 DIAGNOSIS — E66.01 MORBID OBESITY (H): ICD-10-CM

## 2024-03-13 DIAGNOSIS — Z12.5 SCREENING FOR PROSTATE CANCER: ICD-10-CM

## 2024-03-13 DIAGNOSIS — Z00.00 ENCOUNTER FOR MEDICARE ANNUAL WELLNESS EXAM: Primary | ICD-10-CM

## 2024-03-13 DIAGNOSIS — I82.91 RECURRENT THROMBUS: ICD-10-CM

## 2024-03-13 PROBLEM — I80.02 SUPERFICIAL THROMBOPHLEBITIS OF LEFT LEG: Status: RESOLVED | Noted: 2023-09-18 | Resolved: 2024-03-13

## 2024-03-13 LAB
ALBUMIN SERPL BCG-MCNC: 4.5 G/DL (ref 3.5–5.2)
ALP SERPL-CCNC: 57 U/L (ref 40–150)
ALT SERPL W P-5'-P-CCNC: 31 U/L (ref 0–70)
ANION GAP SERPL CALCULATED.3IONS-SCNC: 10 MMOL/L (ref 7–15)
AST SERPL W P-5'-P-CCNC: 29 U/L (ref 0–45)
BILIRUB SERPL-MCNC: 0.8 MG/DL
BUN SERPL-MCNC: 12.9 MG/DL (ref 8–23)
CALCIUM SERPL-MCNC: 9.6 MG/DL (ref 8.8–10.2)
CHLORIDE SERPL-SCNC: 105 MMOL/L (ref 98–107)
CHOLEST SERPL-MCNC: 183 MG/DL
CREAT SERPL-MCNC: 1.23 MG/DL (ref 0.67–1.17)
DEPRECATED HCO3 PLAS-SCNC: 25 MMOL/L (ref 22–29)
EGFRCR SERPLBLD CKD-EPI 2021: 65 ML/MIN/1.73M2
FASTING STATUS PATIENT QL REPORTED: YES
GLUCOSE SERPL-MCNC: 95 MG/DL (ref 70–99)
HBA1C MFR BLD: 5.6 % (ref 0–5.6)
HDLC SERPL-MCNC: 50 MG/DL
LDLC SERPL CALC-MCNC: 109 MG/DL
NONHDLC SERPL-MCNC: 133 MG/DL
POTASSIUM SERPL-SCNC: 4 MMOL/L (ref 3.4–5.3)
PROT SERPL-MCNC: 7 G/DL (ref 6.4–8.3)
PSA SERPL DL<=0.01 NG/ML-MCNC: 0.57 NG/ML (ref 0–4.5)
SODIUM SERPL-SCNC: 140 MMOL/L (ref 135–145)
TRIGL SERPL-MCNC: 122 MG/DL

## 2024-03-13 PROCEDURE — 83036 HEMOGLOBIN GLYCOSYLATED A1C: CPT | Performed by: INTERNAL MEDICINE

## 2024-03-13 PROCEDURE — 80061 LIPID PANEL: CPT | Performed by: INTERNAL MEDICINE

## 2024-03-13 PROCEDURE — 80053 COMPREHEN METABOLIC PANEL: CPT | Performed by: INTERNAL MEDICINE

## 2024-03-13 PROCEDURE — G0103 PSA SCREENING: HCPCS | Performed by: INTERNAL MEDICINE

## 2024-03-13 PROCEDURE — G0402 INITIAL PREVENTIVE EXAM: HCPCS | Performed by: INTERNAL MEDICINE

## 2024-03-13 PROCEDURE — 99214 OFFICE O/P EST MOD 30 MIN: CPT | Mod: 25 | Performed by: INTERNAL MEDICINE

## 2024-03-13 PROCEDURE — 36415 COLL VENOUS BLD VENIPUNCTURE: CPT | Performed by: INTERNAL MEDICINE

## 2024-03-13 RX ORDER — LOSARTAN POTASSIUM 25 MG/1
50 TABLET ORAL DAILY
COMMUNITY
Start: 2024-03-13 | End: 2024-04-23

## 2024-03-13 RX ORDER — RESPIRATORY SYNCYTIAL VIRUS VACCINE 120MCG/0.5
0.5 KIT INTRAMUSCULAR ONCE
Qty: 1 EACH | Refills: 0 | Status: CANCELLED | OUTPATIENT
Start: 2024-03-13 | End: 2024-03-13

## 2024-03-13 ASSESSMENT — PAIN SCALES - GENERAL: PAINLEVEL: NO PAIN (0)

## 2024-03-13 NOTE — PROGRESS NOTES
"Preventive Care Visit  [facility] KELLI Garland MD, Internal Medicine - Pediatrics  Mar 13, 2024      Assessment & Plan     (Z00.00) Encounter for Medicare annual wellness exam  (primary encounter diagnosis)    (I10) Benign essential hypertension  Comment: BP above goal, increase losartan from 25 to 50mg daily, pt will forward home readings 1-2 weeks  Plan: losartan (COZAAR) 25 MG tablet          (E78.5) Hyperlipidemia LDL goal <130  Comment: continue simvastatin, due for labwork  Plan: Lipid panel reflex to direct LDL Non-fasting,         Comprehensive metabolic panel (BMP + Alb, Alk         Phos, ALT, AST, Total. 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      (E66.01) BMI 33  Comment:   Plan: obesity with co-morbidities htn, hyperlipidemia, prediabetes    (Z12.5) Screening for prostate cancer  Comment:   Plan: PSA, screen                      BMI  Estimated body mass index is 33.58 kg/m  as calculated from the following:    Height as of this encounter: 1.805 m (5' 11.06\").    Weight as of this encounter: 109.4 kg (241 lb 3.2 oz).       Counseling  Appropriate preventive services were discussed with this patient, including applicable screening as appropriate for fall prevention, nutrition, physical activity, Tobacco-use cessation, weight loss and cognition.  Checklist reviewing preventive services available has been given to the patient.  Reviewed patient's diet, addressing concerns and/or questions.   He is at risk for lack of exercise and has been provided with information to increase physical activity for the benefit of his well-being.   He is at risk for " psychosocial distress and has been provided with information to reduce risk.           Paulo Mendieta is a 63 year old, presenting for the following:  Physical        3/13/2024    10:40 AM   Additional Questions   Roomed by Ines Goodwin   Accompanied by MERLINE        Health Care Directive  Patient does not have a Health Care Directive or Living Will    HPI              3/11/2024   General Health   How would you rate your overall physical health? (!) FAIR   Feel stress (tense, anxious, or unable to sleep) Only a little   (!) STRESS CONCERN      3/11/2024   Nutrition   Diet: Regular (no restrictions)         3/11/2024   Exercise   Days per week of moderate/strenous exercise 3 days   Average minutes spent exercising at this level 60 min         3/11/2024   Social Factors   Frequency of gathering with friends or relatives Twice a week   Worry food won't last until get money to buy more No   Food not last or not have enough money for food? No   Do you have housing?  Yes   Are you worried about losing your housing? No   Lack of transportation? No   Unable to get utilities (heat,electricity)? No         3/11/2024   Fall Risk   Fallen 2 or more times in the past year? No   Trouble with walking or balance? No          3/11/2024   Activities of Daily Living- Home Safety   Needs help with the following daily activites None of the above   Safety concerns in the home None of the above         3/11/2024   Dental   Dentist two times every year? Yes         3/11/2024   Hearing Screening   Hearing concerns? None of the above         3/11/2024   Driving Risk Screening   Patient/family members have concerns about driving No         3/11/2024   General Alertness/Fatigue Screening   Have you been more tired than usual lately? No         3/11/2024   Urinary Incontinence Screening   Bothered by leaking urine in past 6 months No         3/11/2024   TB Screening   Were you born outside of [place]?  No         Today's PHQ-2 Score:       3/13/2024     10:34 AM   PHQ-2 ( 1999 Pf[employer])   Q1: Little interest or pleasure in doing things 0   Q2: Feeling down, depressed or hopeless 0   PHQ-2 Score 0   Q1: Little interest or pleasure in doing things Not at all   Q2: Feeling down, depressed or hopeless Not at all   PHQ-2 Score 0           3/11/2024   Substance Use   Alcohol more than 3/day or more than 7/wk No   Do you have a current opioid prescription? No   How severe/bad is pain from 1 to 10? 0/10 (No Pain)   Do you use any other substances recreationally? No     Social History     Tobacco Use    Smoking status: Never    Smokeless tobacco: Never   Vaping Use    Vaping Use: Never used   Substance Use Topics    Alcohol use: Not Currently     Comment: rare    Drug use: No           Last PSA:   PSA   Date Value Ref Range Status   10/28/2019 0.40 0 - 4 ug/L Final     Comment:     Assay Method:  Chemiluminescence using Siemens Vista analyzer     Prostate Specific Antigen Screen   Date Value Ref Range Status   01/06/2022 0.51 0.00 - 4.00 ug/L Final     ASCVD Risk   The 10-year ASCVD risk score (Gorge TEJADA, et al., 2019) is: 18.6%    Values used to calculate the score:      Age: 63 years      Sex: Male      Is Non- : No      Diabetic: No      Tobacco smoker: No      Systolic Blood Pressure: 148 mmHg      Is BP treated: Yes      HDL Cholesterol: 46 mg/dL      Total Cholesterol: 184 mg/dL            Reviewed and updated as needed this visit by Provider                    Patient Active Problem List   Diagnosis    HYPERLIPIDEMIA LDL GOAL <130    Status post ileostomy (H)    Atopic dermatitis, unspecified type    Prediabetes    Morbid obesity (H)    Benign essential hypertension    DDD (degenerative disc disease), cervical    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, right DVT 2012, same location 2016, extensive LLE GSV clot 9/2023    Lupus anticoagulant positive     Past Surgical History:   Procedure Laterality Date    HC  KNEE SCOPE,MED/LAT MENISECTOMY  12/10/2004    Right knee, partial medial meniscectomy and chondroplasty medial femoral condyle    HC REMOVAL OF TONSILS,<13 Y/O      HC REPAIR ING HERNIA,5+Y/O,REDUCIBL      ZZC REMOVAL COLON/PROCTECTOMY/ILEOSTOMY  4/18/2006    Total proctocolectomy with intersphincteric proctectomy and end ileostomy       Social History     Tobacco Use    Smoking status: Never    Smokeless tobacco: Never   Substance Use Topics    Alcohol use: Not Currently     Comment: rare     Family History   Problem Relation Age of Onset    Lipids Father     C.A.D. Father         MI related to surgery    Cancer - colorectal Father         diagnosis age 56    Diabetes Brother         type 2    Lipids Brother     Cerebrovascular Disease Brother     Prostate Cancer No family hx of     Blood Disease No family hx of         denies family history of venous thromboembolic dz         Current Outpatient Medications   Medication Sig Dispense Refill    apixaban ANTICOAGULANT (ELIQUIS ANTICOAGULANT) 5 MG tablet Take 1 tablet (5 mg) by mouth 2 times daily 180 tablet 3    losartan (COZAAR) 25 MG tablet Take 2 tablets (50 mg) by mouth daily      simvastatin (ZOCOR) 20 MG tablet Take 1 tablet (20 mg) by mouth daily 90 tablet 11    triamcinolone (KENALOG) 0.1 % external cream APPLY TO RASH TID PRN 60 g 3     Current providers sharing in care for this patient include:  Patient Care Team:  Villa Garland MD as PCP - General (Internal Medicine)  Villa Garland MD as Assigned PCP  Brian Del Rosario PA-C as Assigned Cancer Care Provider    The following health maintenance items are reviewed in Epic and correct as of today:  Health Maintenance   Topic Date Due    RSV VACCINE (Pregnancy & 60+) (1 - 1-dose 60+ series) Never done    Pneumococcal Vaccine: 65+ Years (1 of 1 - PCV) Never done    COVID-19 Vaccine (4 - 2023-24 season) 09/01/2023    ANNUAL REVIEW OF HM ORDERS  09/22/2023    LIPID  01/12/2024    MEDICARE ANNUAL WELLNESS  "VISIT  03/13/2025    FALL RISK ASSESSMENT  03/13/2025    ADVANCE CARE PLANNING  12/15/2025    GLUCOSE  10/04/2026    DTAP/TDAP/TD IMMUNIZATION (3 - Td or Tdap) 12/15/2030    HEPATITIS C SCREENING  Completed    PHQ-2 (once per calendar year)  Completed    INFLUENZA VACCINE  Completed    ZOSTER IMMUNIZATION  Completed    HIV SCREENING  Addressed    IPV IMMUNIZATION  Aged Out    HPV IMMUNIZATION  Aged Out    MENINGITIS IMMUNIZATION  Aged Out    RSV MONOCLONAL ANTIBODY  Aged Out    COLORECTAL CANCER SCREENING  Discontinued         Review of Systems  Constitutional, neuro, ENT, endocrine, pulmonary, cardiac, gastrointestinal, genitourinary, musculoskeletal, integument and psychiatric systems are negative, except as otherwise noted.     Objective    Exam  BP (!) 148/80 (BP Location: Right arm, Patient Position: Sitting, Cuff Size: Adult Large)   Pulse 67   Temp 98.6  F (37  C) (Tympanic)   Resp 16   Ht 1.805 m (5' 11.06\")   Wt 109.4 kg (241 lb 3.2 oz)   SpO2 95%   BMI 33.58 kg/m     Estimated body mass index is 33.58 kg/m  as calculated from the following:    Height as of this encounter: 1.805 m (5' 11.06\").    Weight as of this encounter: 109.4 kg (241 lb 3.2 oz).    Physical Exam  GENERAL: alert and no distress  EYES: Eyes grossly normal to inspection, PERRL and conjunctivae and sclerae normal  HENT: ear canals and TM's normal, nose and mouth without ulcers or lesions  NECK: no adenopathy, no asymmetry, masses, or scars  RESP: lungs clear to auscultation - no rales, rhonchi or wheezes  CV: regular rate and rhythm, normal S1 S2, no S3 or S4, no murmur, click or rub, no peripheral edema  ABDOMEN: soft, nontender, no hepatosplenomegaly, no masses and bowel sounds normal     RLQ ostomy c/d/i  MS: no gross musculoskeletal defects noted, no edema  SKIN: no suspicious lesions or rashes  NEURO: Normal strength and tone, mentation intact and speech normal  PSYCH: mentation appears normal, affect normal/bright        " 3/13/2024   Mini Cog   Clock Draw Score 2 Normal   3 Item Recall 3 objects recalled   Mini Cog Total Score 5         Vision Screen  Reason Vision Screen Not Completed: Patient had exam in last 12 months      Signed Electronically by: Villa Garland MD

## 2024-03-13 NOTE — PATIENT INSTRUCTIONS
Increase losartan to 50mg daily   Forward home BP readings in 1-2 weeks    Preventive Care Advice   This is general advice given by our system to help you stay healthy. However, your care team may have specific advice just for you. Please talk to your care team about your preventive care needs.  Nutrition  Eat 5 or more servings of fruits and vegetables each day.  Try wheat bread, brown rice and whole grain pasta (instead of white bread, rice, and pasta).  Get enough calcium and vitamin D. Check the label on foods and aim for 100% of the RDA (recommended daily allowance).  Lifestyle  Exercise at least 150 minutes each week   (30 minutes a day, 5 days a week).  Do muscle strengthening activities 2 days a week. These help control your weight and prevent disease.  No smoking.  Wear sunscreen to prevent skin cancer.  Have a dental exam and cleaning every 6 months.  Yearly exams  See your health care team every year to talk about:  Any changes in your health.  Any medicines your care team has prescribed.  Preventive care, family planning, and ways to prevent chronic diseases.  Shots (vaccines)   HPV shots (up to age 26), if you've never had them before.  Hepatitis B shots (up to age 59), if you've never had them before.  COVID-19 shot: Get this shot when it's due.  Flu shot: Get a flu shot every year.  Tetanus shot: Get a tetanus shot every 10 years.  Pneumococcal, hepatitis A, and RSV shots: Ask your care team if you need these based on your risk.  Shingles shot (for age 50 and up).  General health tests  Diabetes screening:  Starting at age 35, Get screened for diabetes at least every 3 years.  If you are younger than age 35, ask your care team if you should be screened for diabetes.  Cholesterol test: At age 39, start having a cholesterol test every 5 years, or more often if advised.  Bone density scan (DEXA): At age 50, ask your care team if you should have this scan for osteoporosis (brittle bones).  Hepatitis C:  Get tested at least once in your life.  STIs (sexually transmitted infections)  Before age 24: Ask your care team if you should be screened for STIs.  After age 24: Get screened for STIs if you're at risk. You are at risk for STIs (including HIV) if:  You are sexually active with more than one person.  You don't use condoms every time.  You or a partner was diagnosed with a sexually transmitted infection.  If you are at risk for HIV, ask about PrEP medicine to prevent HIV.  Get tested for HIV at least once in your life, whether you are at risk for HIV or not.  Cancer screening tests  Cervical cancer screening: If you have a cervix, begin getting regular cervical cancer screening tests at age 21. Most people who have regular screenings with normal results can stop after age 65. Talk about this with your provider.  Breast cancer scan (mammogram): If you've ever had breasts, begin having regular mammograms starting at age 40. This is a scan to check for breast cancer.  Colon cancer screening: It is important to start screening for colon cancer at age 45.  Have a colonoscopy test every 10 years (or more often if you're at risk) Or, ask your provider about stool tests like a FIT test every year or Cologuard test every 3 years.  To learn more about your testing options, visit: https://www.[employer]/122777.pdf.  For help making a decision, visit: https://bit.ly/fl60329.  Prostate cancer screening test: If you have a prostate and are age 55 to 69, ask your provider if you would benefit from a yearly prostate cancer screening test.  Lung cancer screening: If you are a current or former smoker age 50 to 80, ask your care team if ongoing lung cancer screenings are right for you.  For informational purposes only. Not to replace the advice of your health care provider. Copyright   2023 [employer]. All rights reserved. Clinically reviewed by the [facility]. [employer] 187114 - REV  01/24.    Learning About Stress  What is stress?     Stress is your body's response to a hard situation. Your body can have a physical, emotional, or mental response. Stress is a fact of life for most people, and it affects everyone differently. What causes stress for you may not be stressful for someone else.  A lot of things can cause stress. You may feel stress when you go on a job interview, take a test, or run a race. This kind of short-term stress is normal and even useful. It can help you if you need to work hard or react quickly. For example, stress can help you finish an important job on time.  Long-term stress is caused by ongoing stressful situations or events. Examples of long-term stress include long-term health problems, ongoing problems at work, or conflicts in your family. Long-term stress can harm your health.  How does stress affect your health?  When you are stressed, your body responds as though you are in danger. It makes hormones that speed up your heart, make you breathe faster, and give you a burst of energy. This is called the fight-or-flight stress response. If the stress is over quickly, your body goes back to normal and no harm is done.  But if stress happens too often or lasts too long, it can have bad effects. Long-term stress can make you more likely to get sick, and it can make symptoms of some diseases worse. If you tense up when you are stressed, you may develop neck, shoulder, or low back pain. Stress is linked to high blood pressure and heart disease.  Stress also harms your emotional health. It can make you guillen, tense, or depressed. Your relationships may suffer, and you may not do well at work or school.  What can you do to manage stress?  You can try these things to help manage stress:   Do something active. Exercise or activity can help reduce stress. Walking is a great way to get started. Even everyday activities such as housecleaning or yard work can help.  Try yoga or benedict  chi. These techniques combine exercise and meditation. You may need some training at first to learn them.  Do something you enjoy. For example, listen to music or go to a movie. Practice your hobby or do volunteer work.  Meditate. This can help you relax, because you are not worrying about what happened before or what may happen in the future.  Do guided imagery. Imagine yourself in any setting that helps you feel calm. You can use online videos, books, or a teacher to guide you.  Do breathing exercises. For example:  From a standing position, bend forward from the waist with your knees slightly bent. Let your arms dangle close to the floor.  Breathe in slowly and deeply as you return to a standing position. Roll up slowly and lift your head last.  Hold your breath for just a few seconds in the standing position.  Breathe out slowly and bend forward from the waist.  Let your feelings out. Talk, laugh, cry, and express anger when you need to. Talking with supportive friends or family, a counselor, or a felipe leader about your feelings is a healthy way to relieve stress. Avoid discussing your feelings with people who make you feel worse.  Write. It may help to write about things that are bothering you. This helps you find out how much stress you feel and what is causing it. When you know this, you can find better ways to cope.  What can you do to prevent stress?  You might try some of these things to help prevent stress:  Manage your time. This helps you find time to do the things you want and need to do.  Get enough sleep. Your body recovers from the stresses of the day while you are sleeping.  Get support. Your family, friends, and community can make a difference in how you experience stress.  Limit your news feed. Avoid or limit time on social media or news that may make you feel stressed.  Do something active. Exercise or activity can help reduce stress. Walking is a great way to get started.  Where can you learn  "more?  Go to https://www.[employer].net/patiented  Enter N032 in the search box to learn more about \"Learning About Stress.\"  Current as of: February 26, 2023               Content Version: 13.8    8878-2641 [employer].   Care instructions adapted under license by your healthcare professional. If you have questions about a medical condition or this instruction, always ask your healthcare professional. [employer] disclaims any warranty or liability for your use of this information.      "

## 2024-03-25 ENCOUNTER — TRANSFERRED RECORDS (OUTPATIENT)
Dept: HEALTH INFORMATION MANAGEMENT | Facility: CLINIC | Age: 64
End: 2024-03-25
Payer: COMMERCIAL

## 2024-04-01 ENCOUNTER — TRANSFERRED RECORDS (OUTPATIENT)
Dept: HEALTH INFORMATION MANAGEMENT | Facility: CLINIC | Age: 64
End: 2024-04-01
Payer: COMMERCIAL

## 2024-04-08 ENCOUNTER — TRANSFERRED RECORDS (OUTPATIENT)
Dept: HEALTH INFORMATION MANAGEMENT | Facility: CLINIC | Age: 64
End: 2024-04-08
Payer: COMMERCIAL

## 2024-04-23 ENCOUNTER — TELEPHONE (OUTPATIENT)
Dept: PEDIATRICS | Facility: CLINIC | Age: 64
End: 2024-04-23
Payer: COMMERCIAL

## 2024-04-23 DIAGNOSIS — I10 BENIGN ESSENTIAL HYPERTENSION: Primary | ICD-10-CM

## 2024-04-23 RX ORDER — LOSARTAN POTASSIUM 50 MG/1
50 TABLET ORAL DAILY
Qty: 90 TABLET | Refills: 3 | Status: SHIPPED | OUTPATIENT
Start: 2024-04-23 | End: 2024-05-31

## 2024-04-23 NOTE — TELEPHONE ENCOUNTER
Patient calling and states his pharmacy told him he had to call us as his losartan RX was closed out.  Losartan on med list as historical from 3/13/24 as dose was increased.  T'd up for losartan 50 mg 1 tab daily.  Routing to provider due med being historical.  Mikala Null RN

## 2024-05-18 ENCOUNTER — MYC MEDICAL ADVICE (OUTPATIENT)
Dept: PEDIATRICS | Facility: CLINIC | Age: 64
End: 2024-05-18
Payer: COMMERCIAL

## 2024-05-18 DIAGNOSIS — I10 BENIGN ESSENTIAL HYPERTENSION: ICD-10-CM

## 2024-05-30 NOTE — TELEPHONE ENCOUNTER
Patient sent message regarding BP readings:  5/20     142/93 pulse 63   5/21     162/86             65   5/22     150/83             75   5/23      150/83            60   5/24      160/89            56   5/25      157/89            54   5/26      154/85            62   5/27      135/88            52   5/28      149/87            57   5/29      163/96            57   Reporting to Anuradha JUNE for Dr. Garland     Please review [employer] message. Patient states in message that Losartan doesn't seem to be as effective and is inquiring if another BP med should be added. Last visit in March for AWV.     PCP: Do you want patient to come in for a BP check? Seems to be pretty consistent since last visit.    BP Readings from Last 3 Encounters:   03/13/24 (!) 148/80   10/17/23 (!) 150/82   10/04/23 124/80     Per last visit:  (I10) Benign essential hypertension  Comment: BP above goal, increase losartan from 25 to 50mg daily, pt will forward home readings 1-2 weeks  Plan: losartan (COZAAR) 25 MG tablet     Thanks!  Gay LYNCH RN, BSN

## 2024-05-31 RX ORDER — LOSARTAN POTASSIUM 50 MG/1
100 TABLET ORAL DAILY
Qty: 90 TABLET | Refills: 1 | COMMUNITY
Start: 2024-05-31 | End: 2024-06-24

## 2024-05-31 NOTE — TELEPHONE ENCOUNTER
Please call patient.  Blood pressure not well-controlled.  Recommend increasing losartan from 50 to 100 mg daily.  He can start taking 2 of the 50 mg tablets once daily.  Please have him forward home blood pressures in the next 2 weeks for further adjustments if needed.

## 2024-05-31 NOTE — TELEPHONE ENCOUNTER
Called the patient at [phone] and left a message requesting a call back   - Provided call back number   - Awaiting a response from the patient at this time     Sent a [employer] message to the patient   - Informed the patient of Dr. Garland's comments/recommendations   - Informed the patient that Dr. Garland recommends that he increase his losartan from 50 to 100 mg daily; informed the patient that he can start taking 2 of the 50 mg tablets once daily   - Informed the patient to forward his home blood pressure readings in the next 2 weeks for further adjustments if needed     losartan (COZAAR) 50 MG tablet 90 tablet 1 5/31/2024 -- No   Sig - Route: Take 2 tablets (100 mg) by mouth daily - Oral     RN to follow-up with the patient in 2 weeks (6/14/2024) to collect his home blood pressure readings.     Emilee JEAN RN   [facility]

## 2024-06-24 ENCOUNTER — MYC REFILL (OUTPATIENT)
Dept: PEDIATRICS | Facility: CLINIC | Age: 64
End: 2024-06-24
Payer: COMMERCIAL

## 2024-06-24 DIAGNOSIS — I10 BENIGN ESSENTIAL HYPERTENSION: ICD-10-CM

## 2024-06-24 RX ORDER — LOSARTAN POTASSIUM 50 MG/1
100 TABLET ORAL DAILY
Qty: 90 TABLET | Refills: 1 | Status: CANCELLED | OUTPATIENT
Start: 2024-06-24

## 2024-06-24 RX ORDER — LOSARTAN POTASSIUM 100 MG/1
100 TABLET ORAL DAILY
Qty: 90 TABLET | Refills: 3 | Status: SHIPPED | OUTPATIENT
Start: 2024-06-24

## 2024-08-27 ENCOUNTER — NURSE TRIAGE (OUTPATIENT)
Dept: PEDIATRICS | Facility: CLINIC | Age: 64
End: 2024-08-27
Payer: COMMERCIAL

## 2024-08-27 ENCOUNTER — MYC MEDICAL ADVICE (OUTPATIENT)
Dept: PEDIATRICS | Facility: CLINIC | Age: 64
End: 2024-08-27
Payer: COMMERCIAL

## 2024-08-27 NOTE — TELEPHONE ENCOUNTER
Patient sent my chart message. RN called to triage patient      S-(situation): laceration on forearm    B-(background): occurred about an hour ago.   Last tetanus December 2020  Patient is on blood thinners    A-(assessment): Patient states he was cut by the tip of a boxcuter on forearm. Laceration is just under 1/2 inch long. Denies any gaping open wound. States he does not see tissue/bone. Endorses a little swelling and painful only to touch.   Patient states that bleeding stopped after 1-2 minutes of pressure. Not currently bleeding. Denies any lightheadedness or weakness.   Patient states he washed wound right away, appears clean.    R-(recommendations): RN advised home care. Instructed patient to call back if he develops gaping or opening of wound, sensation of something in wound, severe pain, spreading redness, fever, signs of infection. Reviewed care advice under care tab with patient. Patient was given an opportunity to ask questions, verbalized understanding of plan, and is agreeable.      Shannan CHURCHILL RN on 8/27/2024 at 3:07 PM           Reason for Disposition   Minor cut or scratch    Additional Information   Negative: Major bleeding (e.g., actively dripping or spurting) and can't be stopped   Negative: Amputation   Negative: Shock suspected (e.g., cold/pale/clammy skin, too weak to stand, low BP, rapid pulse)   Negative: Knife wound (or other possibly deep cut) and to chest, abdomen, back, neck, or head   Negative: Self-injury (e.g., cutting, self-harm) and suicidal or out-of-control   Negative: Sounds like a life-threatening emergency to the triager   Negative: Animal bite and broken skin   Negative: Human bite and broken skin   Negative: Puncture wound   Negative: Bleeding and won't stop after 10 minutes of direct pressure (using correct technique)   Negative: Skin is split open or gaping (or length > 1/2 inch or 12 mm on the skin, 1/4 inch or 6 mm on the face)   Negative: Deep cut and can see bone  "or tendons   Negative: Cut over knuckle (MCP joint)   Negative: Sensation of something in the wound (i.e., retained object in wound)   Negative: Dirt in the wound and not removed with 15 minutes of scrubbing   Negative: SEVERE pain and not improved 2 hours after pain medicine   Negative: Looks infected and large red area (> 2 inches or 5 cm) or streak   Negative: Fever and spreading red area or streak   Negative: Suspicious history for the injury   Negative: Looks infected (spreading redness, pus) and no fever   Negative: No prior tetanus shots (or is not fully vaccinated)   Negative: HIV positive or severe immunodeficiency (severely weak immune system) and DIRTY cut   Negative: Patient wants to be seen   Negative: Last tetanus shot > 5 years ago and DIRTY cut   Negative: Last tetanus shot > 10 years ago and CLEAN cut   Negative: After 14 days and wound isn't healed   Negative: Diabetes mellitus and minor cut or scratch on foot   Negative: Minor cut or scratch and from self-injury (e.g., cutting, self-harm) and stable (i.e., not suicidal, not out of control)   Negative: Wound causes numbness (i.e., loss of sensation)   Negative: Wound causes weakness (i.e., decreased ability to move hand, finger, toe)    Answer Assessment - Initial Assessment Questions  1. APPEARANCE of INJURY: \"What does the injury look like?\"       One long single cut. At an angle. Tip of boxcutter.   2. SIZE: \"How large is the cut?\"       1/2  3. BLEEDING: \"Is it bleeding now?\" If Yes, ask: \"Is it difficult to stop?\"       No- stopped after a month or two   4. LOCATION: \"Where is the injury located?\"       forearm  5. ONSET: \"How long ago did the injury occur?\"       About an hour/hour and a half ago  6. MECHANISM: \"Tell me how it happened.\"       Opening    7. TETANUS: \"When was the last tetanus booster?\"      December 2022  8. PREGNANCY: \"Is there any chance you are pregnant?\" \"When was your last menstrual period?\"      " N/a    Protocols used: Cuts and [id number]

## 2024-08-27 NOTE — TELEPHONE ENCOUNTER
Please see telephone encounter with today's date for triage.    Shannan CHURCHILL RN on 8/27/2024 at 3:08 PM

## 2024-09-12 ENCOUNTER — TRANSFERRED RECORDS (OUTPATIENT)
Dept: HEALTH INFORMATION MANAGEMENT | Facility: CLINIC | Age: 64
End: 2024-09-12
Payer: COMMERCIAL

## 2024-12-06 DIAGNOSIS — E78.5 HYPERLIPIDEMIA LDL GOAL <130: ICD-10-CM

## 2024-12-09 RX ORDER — SIMVASTATIN 20 MG
20 TABLET ORAL DAILY
Qty: 90 TABLET | Refills: 1 | Status: SHIPPED | OUTPATIENT
Start: 2024-12-09
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
## 2024-12-30 ENCOUNTER — VIRTUAL VISIT (OUTPATIENT)
Dept: HEMATOLOGY | Facility: CLINIC | Age: 64
End: 2024-12-30
Attending: PHYSICIAN ASSISTANT
Payer: COMMERCIAL

## 2024-12-30 DIAGNOSIS — I82.91 RECURRENT THROMBUS: ICD-10-CM

## 2024-12-30 DIAGNOSIS — Z86.718 PERSONAL HISTORY OF DVT (DEEP VEIN THROMBOSIS): ICD-10-CM

## 2024-12-30 DIAGNOSIS — I80.02 SUPERFICIAL THROMBOPHLEBITIS OF LEFT LEG: Primary | ICD-10-CM

## 2024-12-30 DIAGNOSIS — Z79.01 CHRONIC ANTICOAGULATION: ICD-10-CM

## 2024-12-30 NOTE — PATIENT INSTRUCTIONS
Anam,    It was nice to see you via video visit earlier today.     Below is a summary of our plan:  No change in regard to your anticoagulation therapy. Please continue to take your apixaban (Eliquis) at 5 mg by mouth every 12 hours.   If you should have any further questions, or experience any unusual bleeding issues or if you should need any invasive or surgical procedures in the future, please call us at [phone] and ask to speak to a nursing staff.  One of our administrative assistants will contact you to schedule your return visit with me in one year. Virtual or in-person visit is fine.     Thank you once again in choosing our clinic as part of your healthcare team.      Brian Del Rosario PA-C, MPAS  Physician Assistant  [facility].

## 2025-01-14 ENCOUNTER — TELEPHONE (OUTPATIENT)
Dept: PEDIATRICS | Facility: CLINIC | Age: 65
End: 2025-01-14
Payer: COMMERCIAL

## 2025-01-14 NOTE — TELEPHONE ENCOUNTER
Forms/Letter Request    Type of form/letter: DME    Type of DME requested: Wound Care Supplies    Do we have the form/letter: Yes:     Who is the form from? [facility]    Where did/will the form come from? form was faxed in    How would you like the form/letter returned: Fax : [phone]        Yesenia DE LUNA, - [place] [address]  [facility] [place]  [facility]

## 2025-03-10 ENCOUNTER — NURSE TRIAGE (OUTPATIENT)
Dept: PEDIATRICS | Facility: CLINIC | Age: 65
End: 2025-03-10
Payer: COMMERCIAL

## 2025-03-10 NOTE — TELEPHONE ENCOUNTER
Nurse Triage SBAR    Is this a 2nd Level Triage? NO    Situation: pt sent [employer] message with concerns for nodules on stoma and possible hernia     Background: pt states he's had nodules on his stoma for years but recently they have become painful. Recently noticed bulge.     Assessment: nodules sting when barrier touches them. 8-9/10 pain but not at all times. 3 nodules total on the actual stoma, not his skin. Reporting severe itching especially after showering. Reporting his itching leaves what looks like petechia but not a rash.     Protocol Recommended Disposition:   See in Office Today    Recommendation: schedule pt apt for tomorrow. No appointment available today. Advised pt to call back with any new or worsening sx. Pt verbally agreed with plan.      Ginny Schmidt RN     Reason for Disposition   Patient wants to be seen    Additional Information   Negative: Widespread rash and bright red, sunburn-like and too weak to stand   Negative: Sounds like a life-threatening emergency to the triager   Negative: Wound infection diagnosed and taking an antibiotic   Negative: Cellulitis diagnosed and taking an antibiotic   Negative: Animal bite wound infection suspected   Negative: Boil suspected (painful red lump)   Negative: Surgical wound infection suspected (post-op)   Negative: Skin glue used to close wound and not infected   Negative: Stitches and not infected   Negative: SEVERE pain in the wound   Negative: SEVERE pain with bending of finger (or toe) in wound on hand (or foot)   Negative: Bright red, wide-spread, sunburn-like rash   Negative: Fever > 103 F (39.4 C)   Negative: Black (necrotic), dark purple, or blisters develop in area of wound   Negative: Patient sounds very sick or weak to the triager   Negative: Looks infected (spreading redness, red streak, pus) and fever   Negative: Looks infected (spreading redness, pus) and face wound   Negative: Red streak runs from the wound and longer than 1 inch (2.5  "cm)   Negative: Finger or toe wound and entire finger or toe swollen   Negative: Looks infected (e.g., spreading redness, pus) and no fever   Negative: Pus or cloudy fluid draining from wound   Negative: No prior tetanus shots (or is not fully vaccinated) and any wound (e.g., cut or scrape)   Negative: HIV positive or severe immunodeficiency (severely weak immune system) and DIRTY cut or scrape    Answer Assessment - Initial Assessment Questions  1. LOCATION: \"Where is the wound located?\"       Stoma     2. WOUND APPEARANCE: \"What does the wound look like?\"       Little nodules/growths on the bottom of the stoma. Possibly one on the skin    3. SIZE: If redness is present, ask: \"What is the size of the red area?\" (Inches, centimeters, or compare to size of a coin)       Small, 3 of them    4. SPREAD: \"What's changed in the last day?\"  \"Do you see any red streaks coming from the wound?\"      None     5. ONSET: \"When did it start to look infected?\"       Had for awhile- few years     6. MECHANISM: \"How did the wound start, what was the cause?\"      One nodule started right after surgery, other came later, possibly rubbing on barrier     7. PAIN: Do you have any pain?\"  If Yes, ask: \"How bad is the pain?\"  (e.g., Scale 1-10; mild, moderate, or severe)       very sensitive- rubs on barrier stinging sensation  8-9/10 not all the time, but when irritated     8. FEVER: \"Do you have a fever?\" If Yes, ask: \"What is your temperature, how was it measured, and when did it start?\"      No     9. OTHER SYMPTOMS: \"Do you have any other symptoms?\" (e.g., shaking chills, weakness, rash elsewhere on body)       Extremely itchy around stoma- especially after shower   Itching causes what looks like petechia    Protocols used: Wound Infection [id number]    "

## 2025-03-11 ENCOUNTER — OFFICE VISIT (OUTPATIENT)
Dept: PEDIATRICS | Facility: CLINIC | Age: 65
End: 2025-03-11
Payer: COMMERCIAL

## 2025-03-11 VITALS
HEART RATE: 58 BPM | BODY MASS INDEX: 34.02 KG/M2 | OXYGEN SATURATION: 97 % | RESPIRATION RATE: 20 BRPM | DIASTOLIC BLOOD PRESSURE: 85 MMHG | HEIGHT: 71 IN | TEMPERATURE: 96.9 F | WEIGHT: 243 LBS | SYSTOLIC BLOOD PRESSURE: 148 MMHG

## 2025-03-11 DIAGNOSIS — I10 BENIGN ESSENTIAL HYPERTENSION: ICD-10-CM

## 2025-03-11 DIAGNOSIS — L30.9 DERMATITIS: ICD-10-CM

## 2025-03-11 DIAGNOSIS — Z93.2 STATUS POST ILEOSTOMY (H): Primary | ICD-10-CM

## 2025-03-11 DIAGNOSIS — K43.9 VENTRAL HERNIA WITHOUT OBSTRUCTION OR GANGRENE: ICD-10-CM

## 2025-03-11 PROBLEM — E66.01 MORBID OBESITY (H): Status: RESOLVED | Noted: 2022-01-06 | Resolved: 2025-03-11

## 2025-03-11 PROCEDURE — 99214 OFFICE O/P EST MOD 30 MIN: CPT | Performed by: INTERNAL MEDICINE

## 2025-03-11 PROCEDURE — 3077F SYST BP >= 140 MM HG: CPT | Performed by: INTERNAL MEDICINE

## 2025-03-11 PROCEDURE — 1125F AMNT PAIN NOTED PAIN PRSNT: CPT | Performed by: INTERNAL MEDICINE

## 2025-03-11 PROCEDURE — 3079F DIAST BP 80-89 MM HG: CPT | Performed by: INTERNAL MEDICINE

## 2025-03-11 RX ORDER — TELMISARTAN 80 MG/1
80 TABLET ORAL DAILY
Qty: 30 TABLET | Refills: 2 | Status: SHIPPED | OUTPATIENT
Start: 2025-03-11

## 2025-03-11 ASSESSMENT — PAIN SCALES - GENERAL: PAINLEVEL_OUTOF10: SEVERE PAIN (8)

## 2025-03-11 NOTE — PROGRESS NOTES
"  Assessment & Plan     (Z93.2) Status post ileostomy (H)  (primary encounter diagnosis)  Comment:   (L30.9) Dermatitis  Plan: Ostomy Care Referral, Adult Colorectal Surgery          Referral           Having difficulty with ileostomy stoma.  Has noted irritation at the stoma base which is uncomfortable as well as relatively new benign appearing mucosal polypoid lesions which make it difficult to apply the ostomy appliance.  Has tried some topical preparations without much improvement.  Referral to ostomy care clinic.      (K43.9) Ventral hernia without obstruction or gangrene  Comment:   Plan: Suspect peristomal hernia, referral to colorectal surgery    (I10) Benign essential hypertension  Comment:   Plan: telmisartan (MICARDIS) 80 MG tablet        Home blood pressure readings have been elevated, BP above goal in clinic today.  Discontinue losartan start telmisartan, patient will forward home blood pressure readings in 2 weeks      BMI  Estimated body mass index is 33.89 kg/m  as calculated from the following:    Height as of this encounter: 1.803 m (5' 11\").    Weight as of this encounter: 110.2 kg (243 lb).             Paulo Mendieta is a 64 year old, presenting for the following health issues:  Gastric Problem (Stoma problem)        3/11/2025     1:59 PM   Additional Questions   Accompanied by Spouse         3/11/2025     1:56 PM   Patient Reported Additional Medications   Patient reports taking the following new medications None     History of Present Illness       Reason for visit:  Stoma care  Symptom onset:  More than a month  Symptoms include:  Pain, skin care  Symptom intensity:  Moderate  Symptom progression:  Worsening  Had these symptoms before:  No  What makes it worse:  No  What makes it better:  No   He is taking medications regularly.      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 180 tablet 3    simvastatin (ZOCOR) 20 MG tablet Take 1 tablet (20 mg) by mouth daily. 90 tablet 1    Losartan 100mg       triamcinolone (KENALOG) 0.1 % external cream APPLY TO RASH TID PRN 60 g 3            Objective    BP (!) 148/85 (BP Location: Left arm, Patient Position: Sitting, Cuff Size: Adult Regular)   Pulse 58   Temp 96.9  F (36.1  C) (Tympanic)   Resp 20   Ht 1.803 m (5' 11\")   Wt 110.2 kg (243 lb)   SpO2 97%   BMI 33.89 kg/m    Body mass index is 33.89 kg/m .  Physical Exam   GENERAL: alert and no distress  ABDOMEN:nontender peristomal mass, suspect ventral hernia  MS: no gross musculoskeletal defects noted, no edema  SKIN: stoma-mucosa normal.  Several polypoid mucosal lesions at stoma base, skin adjacent to base appears mildly inflamed  PSYCH: mentation appears normal, affect normal/bright            Signed Electronically by: Villa Garland MD    "

## 2025-03-11 NOTE — PATIENT INSTRUCTIONS
Schedule visits with colorectal surgery and ostomy nurse    High blood pressure: stop losartan, start telmisartan     Forward home BP readings in the next 2 weeks

## 2025-03-18 ENCOUNTER — TELEPHONE (OUTPATIENT)
Dept: WOUND CARE | Facility: CLINIC | Age: 65
End: 2025-03-18
Payer: COMMERCIAL

## 2025-03-20 ENCOUNTER — HOSPITAL ENCOUNTER (OUTPATIENT)
Dept: WOUND CARE | Facility: CLINIC | Age: 65
Discharge: HOME | End: 2025-03-20
Attending: INTERNAL MEDICINE
Payer: COMMERCIAL

## 2025-03-20 PROCEDURE — 17250 CHEM CAUT OF GRANLTJ TISSUE: CPT

## 2025-03-20 PROCEDURE — G0463 HOSPITAL OUTPT CLINIC VISIT: HCPCS | Mod: 25

## 2025-03-20 SDOH — HEALTH STABILITY: PHYSICAL HEALTH: ON AVERAGE, HOW MANY MINUTES DO YOU ENGAGE IN EXERCISE AT THIS LEVEL?: 60 MIN

## 2025-03-20 SDOH — HEALTH STABILITY: PHYSICAL HEALTH: ON AVERAGE, HOW MANY DAYS PER WEEK DO YOU ENGAGE IN MODERATE TO STRENUOUS EXERCISE (LIKE A BRISK WALK)?: 3 DAYS

## 2025-03-20 ASSESSMENT — SOCIAL DETERMINANTS OF HEALTH (SDOH): HOW OFTEN DO YOU GET TOGETHER WITH FRIENDS OR RELATIVES?: ONCE A WEEK

## 2025-03-20 NOTE — PROGRESS NOTES
[facility]  OUTPATIENT OSTOMY ASSESSMENT    INTAKE  Type of Stoma: Permanent Ileostomy    Diagnosis Pertinent to Stoma: Ulcerative Colitis   Surgery Date:   Surgeon:[facility]: [facility]    Purpose of this visit: Peristomal Complications and Peristomal Hernia    Pertinent Information: Patient has some painful skin irritation below stoma    Present for Teaching Session: Patient and Spouse   Present: NA      Current Equipment:    Product # Brand Description   Pouch [id number] [place] 2 pc Drainable   Skin Barrier (Flange) 31079 Armand CTF convex   Ring/Paste 780 Armand Adapt     Powder antifungal   Antifungal powder   Liquid Skin Barrier unknown Skin prep Skin Prep                 Pouch Change Frequency: 3-7 days  Provider of Care: Emptying: self Pouch Change: self    ASSESSMENT  Stoma Size: Oval 1 3/8 x 1 7/8 inches  Protrusion:  2cm  Stoma Appearance: Pink and Red, 1 granuloma at 6 o'clock  Mucocutaneous Juncture: Intact   Peristomal Skin: ridge of hypergranulation tissue from 4-6 o'clock, partial thickness denudement from 1-4 o'clock  Abdominal Assessment: Hernia (location: circumferential)        Hernia length:  5 inch  Abdominal girth standin inches  Abdominal girth laying down for 5 minutes:  44 1/2 inches    TREATMENT  Silver Nitrate to : granuloma and hypergranulation tissue # of Sticks used: 1    INTERVENTIONS  Refitting done, Educated on peristomal skin treatment, and Signed up for Coloplast or Armand support program    INSTRUCTIONS GIVEN  WOC Role, Anatomy, Hernia, Insurance, and Ordering supplies    PLAN  Change in Supplies: See Outpatient Ostomy Instructions and New Pouching Procedure: See Outpatient Ostomy Instructions      Total Time Spent with Patient: 50 minutes

## 2025-03-20 NOTE — DISCHARGE INSTRUCTIONS
OUTPATIENT OSTOMY INSTRUCTIONS                                                                        Type of Stoma: Ileostomy         Supplier: [employer] [phone]      Equipment:    Product # Brand Description   Pouch 33444 Armand 2 pc Drainable   Flange 35481 Filion Cut to fit soft convex   Ring 4565 Filion Adapt   Powder 7906 Filion Adapt   Liquid Skin Barrier 3344 3M Cavilon No Sting         Belt 41527 Coloplast 2XL ostomy support belt                    Procedure:  Close the bottom of the pouch.  If needed cut the opening of your pouch to the correct size (follow pattern or 1/8 inch larger than stoma) and then remove backings from adhesive surfaces.  Remove the soiled pouch and discard.  Wash skin with water and dry.    Then: Apply powder to open areas only, brush off excess powder. and Apply No-Sting over powdered area.  Then: Apply Ring/Paste: Around edge of pouch opening.               Then: Apply pouching system to stoma site and hold in place for 2-5 minutes.     ______________________________________________________________________________    Pouch Change: Twice weekly       WOC Nurse Specialist: Shailesh Jacobs RN CWOCN                  Questions: Rachelle [phone] ()      Follow-up Appointment: as needed. Please call Rachelle [phone] () to request an appointment.

## 2025-03-24 ENCOUNTER — OFFICE VISIT (OUTPATIENT)
Dept: PEDIATRICS | Facility: CLINIC | Age: 65
End: 2025-03-24
Attending: INTERNAL MEDICINE
Payer: COMMERCIAL

## 2025-03-24 VITALS
HEIGHT: 70 IN | HEART RATE: 71 BPM | WEIGHT: 243.1 LBS | OXYGEN SATURATION: 95 % | TEMPERATURE: 98.2 F | RESPIRATION RATE: 17 BRPM | DIASTOLIC BLOOD PRESSURE: 74 MMHG | SYSTOLIC BLOOD PRESSURE: 138 MMHG | BODY MASS INDEX: 34.8 KG/M2

## 2025-03-24 DIAGNOSIS — I10 BENIGN ESSENTIAL HYPERTENSION: ICD-10-CM

## 2025-03-24 DIAGNOSIS — R73.03 PREDIABETES: ICD-10-CM

## 2025-03-24 DIAGNOSIS — E78.5 HYPERLIPIDEMIA LDL GOAL <130: ICD-10-CM

## 2025-03-24 DIAGNOSIS — Z00.00 ENCOUNTER FOR MEDICARE ANNUAL WELLNESS EXAM: Primary | ICD-10-CM

## 2025-03-24 DIAGNOSIS — I82.91 RECURRENT THROMBUS: ICD-10-CM

## 2025-03-24 DIAGNOSIS — Z93.2 STATUS POST ILEOSTOMY (H): ICD-10-CM

## 2025-03-24 DIAGNOSIS — Z12.5 SCREENING FOR PROSTATE CANCER: ICD-10-CM

## 2025-03-24 DIAGNOSIS — L30.9 DERMATITIS: ICD-10-CM

## 2025-03-24 LAB
ALBUMIN SERPL BCG-MCNC: 4.2 G/DL (ref 3.5–5.2)
ALP SERPL-CCNC: 64 U/L (ref 40–150)
ALT SERPL W P-5'-P-CCNC: 26 U/L (ref 0–70)
ANION GAP SERPL CALCULATED.3IONS-SCNC: 9 MMOL/L (ref 7–15)
AST SERPL W P-5'-P-CCNC: 26 U/L (ref 0–45)
BILIRUB SERPL-MCNC: 0.6 MG/DL
BUN SERPL-MCNC: 14.8 MG/DL (ref 8–23)
CALCIUM SERPL-MCNC: 9.4 MG/DL (ref 8.8–10.4)
CHLORIDE SERPL-SCNC: 106 MMOL/L (ref 98–107)
CHOLEST SERPL-MCNC: 174 MG/DL
CREAT SERPL-MCNC: 1.26 MG/DL (ref 0.67–1.17)
EGFRCR SERPLBLD CKD-EPI 2021: 63 ML/MIN/1.73M2
EST. AVERAGE GLUCOSE BLD GHB EST-MCNC: 114 MG/DL
FASTING STATUS PATIENT QL REPORTED: ABNORMAL
FASTING STATUS PATIENT QL REPORTED: ABNORMAL
GLUCOSE SERPL-MCNC: 102 MG/DL (ref 70–99)
HBA1C MFR BLD: 5.6 % (ref 0–5.6)
HCO3 SERPL-SCNC: 26 MMOL/L (ref 22–29)
HDLC SERPL-MCNC: 48 MG/DL
LDLC SERPL CALC-MCNC: 105 MG/DL
NONHDLC SERPL-MCNC: 126 MG/DL
POTASSIUM SERPL-SCNC: 4.1 MMOL/L (ref 3.4–5.3)
PROT SERPL-MCNC: 6.9 G/DL (ref 6.4–8.3)
PSA SERPL DL<=0.01 NG/ML-MCNC: 0.59 NG/ML (ref 0–4.5)
SODIUM SERPL-SCNC: 141 MMOL/L (ref 135–145)
TRIGL SERPL-MCNC: 107 MG/DL

## 2025-03-24 PROCEDURE — 83036 HEMOGLOBIN GLYCOSYLATED A1C: CPT | Performed by: INTERNAL MEDICINE

## 2025-03-24 PROCEDURE — 80053 COMPREHEN METABOLIC PANEL: CPT | Performed by: INTERNAL MEDICINE

## 2025-03-24 PROCEDURE — G0103 PSA SCREENING: HCPCS | Performed by: INTERNAL MEDICINE

## 2025-03-24 PROCEDURE — G0438 PPPS, INITIAL VISIT: HCPCS | Performed by: INTERNAL MEDICINE

## 2025-03-24 PROCEDURE — 99214 OFFICE O/P EST MOD 30 MIN: CPT | Mod: 25 | Performed by: INTERNAL MEDICINE

## 2025-03-24 PROCEDURE — 3078F DIAST BP <80 MM HG: CPT | Performed by: INTERNAL MEDICINE

## 2025-03-24 PROCEDURE — 80061 LIPID PANEL: CPT | Performed by: INTERNAL MEDICINE

## 2025-03-24 PROCEDURE — 3075F SYST BP GE 130 - 139MM HG: CPT | Performed by: INTERNAL MEDICINE

## 2025-03-24 PROCEDURE — 36415 COLL VENOUS BLD VENIPUNCTURE: CPT | Performed by: INTERNAL MEDICINE

## 2025-03-24 PROCEDURE — 1126F AMNT PAIN NOTED NONE PRSNT: CPT | Performed by: INTERNAL MEDICINE

## 2025-03-24 RX ORDER — CLOTRIMAZOLE 1 %
CREAM (GRAM) TOPICAL 2 TIMES DAILY
Qty: 45 G | Refills: 1 | Status: SHIPPED | OUTPATIENT
Start: 2025-03-24

## 2025-03-24 RX ORDER — SIMVASTATIN 20 MG
20 TABLET ORAL DAILY
Qty: 90 TABLET | Refills: 11 | Status: SHIPPED | OUTPATIENT
Start: 2025-03-24

## 2025-03-24 RX ORDER — AMLODIPINE BESYLATE 2.5 MG/1
2.5 TABLET ORAL DAILY
Qty: 60 TABLET | Refills: 1 | Status: SHIPPED | OUTPATIENT
Start: 2025-03-24

## 2025-03-24 RX ORDER — TELMISARTAN 80 MG/1
80 TABLET ORAL DAILY
Qty: 90 TABLET | Refills: 11 | Status: SHIPPED | OUTPATIENT
Start: 2025-03-24

## 2025-03-24 ASSESSMENT — PAIN SCALES - GENERAL: PAINLEVEL_OUTOF10: NO PAIN (0)

## 2025-03-24 NOTE — PROGRESS NOTES
"Preventive Care Visit  [facility] KELLI Garland MD, Internal Medicine - Pediatrics  Mar 24, 2025      Assessment & Plan     (Z00.00) Encounter for Medicare annual wellness exam  (primary encounter diagnosis)    (R73.03) Prediabetes  Comment:   Plan: Hemoglobin A1c          (I10) Benign essential hypertension  Comment:   Plan: telmisartan (MICARDIS) 80 MG tablet, amLODIPine        (NORVASC) 2.5 MG tablet           Home readings 130-140's/80's.  Inadequate control.  Add amlodipine.  Pt will forward home readings in 2-3wks    (E78.5) Hyperlipidemia LDL goal <130  Comment:   Plan: Lipid panel reflex to direct LDL Non-fasting,         simvastatin (ZOCOR) 20 MG tablet, Comprehensive        metabolic panel (BMP + Alb, Alk Phos, ALT, AST,        Total. 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    Weight as of this encounter: 110.3 kg (243 lb 1.6 oz).       Counseling  Appropriate preventive services were addressed with this patient via screening, questionnaire, or discussion as appropriate for fall prevention, nutrition, physical activity, Tobacco-use cessation, social engagement, weight loss and cognition.  Checklist reviewing preventive services available has been given to the patient.  Reviewed patient's diet, addressing concerns and/or questions.   He is at risk for lack of " exercise and has been provided with information to increase physical activity for the benefit of his well-being.           Paulo Mendieta is a 64 year old, presenting for the following:  Medicare Visit        3/24/2025     9:25 AM   Additional Questions   Roomed by Jake Graham         3/24/2025     9:25 AM   Patient Reported Additional Medications   Patient reports taking the following new medications N/A           HPI           Advance Care Planning  Patient does not have a Health Care Directive: Discussed advance care planning with patient; information given to patient to review.      3/20/2025   General Health   How would you rate your overall physical health? Good   Feel stress (tense, anxious, or unable to sleep) Only a little   (!) STRESS CONCERN      3/20/2025   Nutrition   Diet: Regular (no restrictions)         3/20/2025   Exercise   Days per week of moderate/strenous exercise 3 days   Average minutes spent exercising at this level 60 min         3/20/2025   Social Factors   Frequency of gathering with friends or relatives Once a week   Worry food won't last until get money to buy more Patient declined   Food not last or not have enough money for food? Patient declined   Do you have housing? (Housing is defined as stable permanent housing and does not include staying ouside in a car, in a tent, in an abandoned building, in an overnight shelter, or couch-surfing.) Patient declined   Are you worried about losing your housing? Patient declined   Lack of transportation? Patient declined   Unable to get utilities (heat,electricity)? Patient declined         3/20/2025   Fall Risk   Fallen 2 or more times in the past year? No   Trouble with walking or balance? No          3/20/2025   Activities of Daily Living- Home Safety   Needs help with the following daily activites None of the above   Safety concerns in the home None of the above         3/20/2025   Dental   Dentist two times every year? Yes          3/20/2025   Hearing Screening   Hearing concerns? None of the above         3/20/2025   Driving Risk Screening   Patient/family members have concerns about driving No         3/20/2025   General Alertness/Fatigue Screening   Have you been more tired than usual lately? No         3/20/2025   Urinary Incontinence Screening   Bothered by leaking urine in past 6 months No           3/11/2024   TB Screening   Were you born outside of the [place]? No           Today's PHQ-2 Score:       3/24/2025     9:19 AM   PHQ-2 ( 1999 Pfizer)   Q1: Little interest or pleasure in doing things 0   Q2: Feeling down, depressed or hopeless 0   PHQ-2 Score 0    Q1: Little interest or pleasure in doing things Not at all   Q2: Feeling down, depressed or hopeless Not at all   PHQ-2 Score 0       Patient-reported           3/20/2025   Substance Use   Alcohol more than 3/day or more than 7/wk Not Applicable   Do you have a current opioid prescription? No   How severe/bad is pain from 1 to 10? 0/10 (No Pain)   Do you use any other substances recreationally? No     Social History     Tobacco Use    Smoking status: Never    Smokeless tobacco: Never   Vaping Use    Vaping status: Never Used   Substance Use Topics    Alcohol use: Not Currently     Comment: rare    Drug use: No         Last PSA:   PSA   Date Value Ref Range Status   10/28/2019 0.40 0 - 4 ug/L Final     Comment:     Assay Method:  Chemiluminescence using Siemens Vista analyzer     Prostate Specific Antigen Screen   Date Value Ref Range Status   03/13/2024 0.57 0.00 - 4.50 ng/mL Final   01/06/2022 0.51 0.00 - 4.00 ug/L Final     ASCVD Risk   The 10-year ASCVD risk score (Gorge TEJADA, et al., 2019) is: 17%    Values used to calculate the score:      Age: 64 years      Sex: Male      Is Non- : No      Diabetic: No      Tobacco smoker: No      Systolic Blood Pressure: 138 mmHg      Is BP treated: Yes      HDL Cholesterol: 50 mg/dL      Total Cholesterol: 183  mg/dL            Reviewed and updated as needed this visit by Provider                    Patient Active Problem List   Diagnosis    HYPERLIPIDEMIA LDL GOAL <130    Status post ileostomy (H)    Atopic dermatitis, unspecified type    Prediabetes    Benign essential hypertension    DDD (degenerative disc disease), cervical    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, right DVT 2012, same location 2016, extensive LLE GSV clot 9/2023    Lupus anticoagulant positive     Past Surgical History:   Procedure Laterality Date    HC KNEE SCOPE,MED/LAT MENISECTOMY  12/10/2004    Right knee, partial medial meniscectomy and chondroplasty medial femoral condyle    HC REMOVAL OF TONSILS,<11 Y/O      HC REPAIR ING HERNIA,5+Y/O,REDUCIBL      ZZC REMOVAL COLON/PROCTECTOMY/ILEOSTOMY  4/18/2006    Total proctocolectomy with intersphincteric proctectomy and end ileostomy       Social History     Tobacco Use    Smoking status: Never    Smokeless tobacco: Never   Substance Use Topics    Alcohol use: Not Currently     Comment: rare     Family History   Problem Relation Age of Onset    Lipids Father     C.A.D. Father         MI related to surgery    Cancer - colorectal Father         diagnosis age 56    Diabetes Brother         type 2    Lipids Brother     Cerebrovascular Disease Brother     Prostate Cancer No family hx of     Blood Disease No family hx of         denies family history of venous thromboembolic dz         Current Outpatient Medications   Medication Sig Dispense Refill           apixaban ANTICOAGULANT (ELIQUIS ANTICOAGULANT) 5 MG tablet Take 1 tablet (5 mg) by mouth 2 times daily. 180 tablet 3           simvastatin (ZOCOR) 20 MG tablet Take 1 tablet (20 mg) by mouth daily. 90 tablet 11    telmisartan (MICARDIS) 80 MG tablet Take 1 tablet (80 mg) by mouth daily. 90 tablet 11    triamcinolone (KENALOG) 0.1 % external cream APPLY TO RASH TID PRN 60 g 3     Current providers sharing in care for this patient  "include:  Patient Care Team:  Villa Garland MD as PCP - General (Internal Medicine)  Villa Garland MD as Assigned PCP  Brian Del Rosario PA-C as Assigned Cancer Care Provider    The following health maintenance items are reviewed in Epic and correct as of today:  Health Maintenance   Topic Date Due    Pneumococcal Vaccine: 50+ Years (1 of 1 - PCV) Never done    ANNUAL REVIEW OF HM ORDERS  09/22/2023    COVID-19 Vaccine (4 - 2024-25 season) 09/01/2024    BMP  03/13/2025    LIPID  03/13/2025    MEDICARE ANNUAL WELLNESS VISIT  03/13/2025    ADVANCE CARE PLANNING  12/15/2025    FALL RISK ASSESSMENT  03/24/2026    DIABETES SCREENING  03/13/2027    DTAP/TDAP/TD IMMUNIZATION (3 - Td or Tdap) 12/15/2030    RSV VACCINE (1 - 1-dose 75+ series) 08/30/2033    HEPATITIS C SCREENING  Completed    PHQ-2 (once per calendar year)  Completed    INFLUENZA VACCINE  Completed    ZOSTER IMMUNIZATION  Completed    HPV IMMUNIZATION  Aged Out    MENINGITIS IMMUNIZATION  Aged Out    COLORECTAL CANCER SCREENING  Discontinued         Review of Systems  Constitutional, neuro, ENT, endocrine, pulmonary, cardiac, gastrointestinal, genitourinary, musculoskeletal, integument and psychiatric systems are negative, except as otherwise noted.     Objective    Exam  /74   Pulse 71   Temp 98.2  F (36.8  C) (Temporal)   Resp 17   Ht 1.781 m (5' 10.12\")   Wt 110.3 kg (243 lb 1.6 oz)   SpO2 95%   BMI 34.76 kg/m     Estimated body mass index is 34.76 kg/m  as calculated from the following:    Height as of this encounter: 1.781 m (5' 10.12\").    Weight as of this encounter: 110.3 kg (243 lb 1.6 oz).    Physical Exam  GENERAL: alert and no distress  EYES: Eyes grossly normal to inspection, PERRL and conjunctivae and sclerae normal  HENT: ear canals and TM's normal, nose and mouth without ulcers or lesions  NECK: no adenopathy, no asymmetry, masses, or scars  RESP: lungs clear to auscultation - no rales, rhonchi or wheezes  CV: regular " rate and rhythm, normal S1 S2, no S3 or S4, no murmur, click or rub, no peripheral edema  ABDOMEN: soft, nontender, no hepatosplenomegaly, no masses and bowel sounds normal     RLQ ostomy  MS: no gross musculoskeletal defects noted, no edema  SKIN:  erythematous annular rash crease left axilla  NEURO: Normal strength and tone, mentation intact and speech normal  PSYCH: mentation appears normal, affect normal/bright        3/24/2025   Mini Cog   Clock Draw Score 2 Normal   3 Item Recall 3 objects recalled   Mini Cog Total Score 5             3/13/2024   Vision Screen   Reason Vision Screen Not Completed Patient had exam in last 12 months       Signed Electronically by: Villa Garland MD

## 2025-03-24 NOTE — PATIENT INSTRUCTIONS
Instructions--  High Blood pressure  Continue telmisartan  Add amlodipine 2.5mg  Forward BP readings in 2-3 weeks  Preventive Care Advice   This is general advice given by our system to help you stay healthy. However, your care team may have specific advice just for you. Please talk to your care team about your preventive care needs.  Nutrition  Eat 5 or more servings of fruits and vegetables each day.  Try wheat bread, brown rice and whole grain pasta (instead of white bread, rice, and pasta).  Get enough calcium and vitamin D. Check the label on foods and aim for 100% of the RDA (recommended daily allowance).  Lifestyle  Exercise at least 150 minutes each week  (30 minutes a day, 5 days a week).  Do muscle strengthening activities 2 days a week. These help control your weight and prevent disease.  No smoking.  Wear sunscreen to prevent skin cancer.  Have a dental exam and cleaning every 6 months.  Yearly exams  See your health care team every year to talk about:  Any changes in your health.  Any medicines your care team has prescribed.  Preventive care, family planning, and ways to prevent chronic diseases.  Shots (vaccines)   HPV shots (up to age 26), if you've never had them before.  Hepatitis B shots (up to age 59), if you've never had them before.  COVID-19 shot: Get this shot when it's due.  Flu shot: Get a flu shot every year.  Tetanus shot: Get a tetanus shot every 10 years.  Pneumococcal, hepatitis A, and RSV shots: Ask your care team if you need these based on your risk.  Shingles shot (for age 50 and up)  General health tests  Diabetes screening:  Starting at age 35, Get screened for diabetes at least every 3 years.  If you are younger than age 35, ask your care team if you should be screened for diabetes.  Cholesterol test: At age 39, start having a cholesterol test every 5 years, or more often if advised.  Bone density scan (DEXA): At age 50, ask your care team if you should have this scan for  osteoporosis (brittle bones).  Hepatitis C: Get tested at least once in your life.  STIs (sexually transmitted infections)  Before age 24: Ask your care team if you should be screened for STIs.  After age 24: Get screened for STIs if you're at risk. You are at risk for STIs (including HIV) if:  You are sexually active with more than one person.  You don't use condoms every time.  You or a partner was diagnosed with a sexually transmitted infection.  If you are at risk for HIV, ask about PrEP medicine to prevent HIV.  Get tested for HIV at least once in your life, whether you are at risk for HIV or not.  Cancer screening tests  Cervical cancer screening: If you have a cervix, begin getting regular cervical cancer screening tests starting at age 21.  Breast cancer scan (mammogram): If you've ever had breasts, begin having regular mammograms starting at age 40. This is a scan to check for breast cancer.  Colon cancer screening: It is important to start screening for colon cancer at age 45.  Have a colonoscopy test every 10 years (or more often if you're at risk) Or, ask your provider about stool tests like a FIT test every year or Cologuard test every 3 years.  To learn more about your testing options, visit:   .  For help making a decision, visit:   https://bit.ly/aa44716.  Prostate cancer screening test: If you have a prostate, ask your care team if a prostate cancer screening test (PSA) at age 55 is right for you.  Lung cancer screening: If you are a current or former smoker ages 50 to 80, ask your care team if ongoing lung cancer screenings are right for you.  For informational purposes only. Not to replace the advice of your health care provider. Copyright   2023 [employer]. All rights reserved. Clinically reviewed by the [facility] Transitions Program. [employer] [id number] - REV 01/24.  Eating Healthy Foods: Care Instructions  With every meal, you can make healthy food choices. Try to eat a  "variety of fruits, vegetables, whole grains, lean proteins, and low-fat dairy products. This can help you get the right balance of nutrients, including vitamins and minerals. Small changes add up over time. You can start by adding one healthy food to your meals each day.    Try to make half your plate fruits and vegetables, one-fourth whole grains, and one-fourth lean proteins. Try including dairy with your meals.   Eat more fruits and vegetables. Try to have them with most meals and snacks.   Foods for healthy eating        Fruits   These can be fresh, frozen, canned, or dried.  Try to choose whole fruit rather than fruit juice.  Eat a variety of colors.        Vegetables   These can be fresh, frozen, canned, or dried.  Beans, peas, and lentils count too.        Whole grains   Choose whole-grain breads, cereals, and noodles.  Try brown rice.        Lean proteins   These can include lean meat, poultry, fish, and eggs.  You can also have tofu, beans, peas, lentils, nuts, and seeds.        Dairy   Try milk, yogurt, and cheese.  Choose low-fat or fat-free when you can.  If you need to, use lactose-free milk or fortified plant-based milk products, such as soy milk.        Water   Drink water when you're thirsty.  Limit sugar-sweetened drinks, including soda, fruit drinks, and sports drinks.  Where can you learn more?  Go to https://www.[employer].net/patiented  Enter T756 in the search box to learn more about \"Eating Healthy Foods: Care Instructions.\"  Current as of: October 7, 2024  Content Version: 14.4    [id number] [employer].   Care instructions adapted under license by your healthcare professional. If you have questions about a medical condition or this instruction, always ask your healthcare professional. [employer] disclaims any warranty or liability for your use of this information.       "

## 2025-04-02 NOTE — TELEPHONE ENCOUNTER
"Requested Prescriptions   Pending Prescriptions Disp Refills     simvastatin (ZOCOR) 20 MG tablet  Last Written Prescription Date:  09/06/2017  Last Fill Quantity: 90,  # refills: 3   Last office visit: 2/14/2018 with prescribing provider:  AJIT   Future Office Visit:     90 tablet 3     Sig: Take 1 tablet (20 mg) by mouth daily    Statins Protocol Failed    11/15/2018  6:39 PM       Failed - LDL on file in past 12 months    Recent Labs   Lab Test  09/06/17   0928   LDL  109*            Passed - No abnormal creatine kinase in past 12 months    No lab results found.            Passed - Recent (12 mo) or future (30 days) visit within the authorizing provider's specialty    Patient had office visit in the last 12 months or has a visit in the next 30 days with authorizing provider or within the authorizing provider's specialty.  See \"Patient Info\" tab in inbasket, or \"Choose Columns\" in Meds & Orders section of the refill encounter.             Passed - Patient is age 18 or older          " In an effort to ensure that our patients [employer], a Team Member has reviewed your chart and identified an opportunity to provide the best care possible. An attempt was made to discuss or schedule due or overdue Preventive or Chronic Condition care.Care Gaps identified: Immunizations.    The Outcome was Contact was not made, letter/portal message sent.  We are attempting to schedule a physical with some fasting labs a week prior. . If you have any questions or need help with scheduling, contact your primary care provider..   Type of Appointment needed:  physical with some fasting labs a week prior.

## 2025-06-11 ENCOUNTER — MYC MEDICAL ADVICE (OUTPATIENT)
Dept: PEDIATRICS | Facility: CLINIC | Age: 65
End: 2025-06-11
Payer: COMMERCIAL

## 2025-06-11 NOTE — TELEPHONE ENCOUNTER
Dr. Garland    Please see my chart BP readings :)    Angeline Waters RN BSN  Clinic Nurse  [facility]

## 2025-06-12 NOTE — TELEPHONE ENCOUNTER
BP readings look much better. Goal 120/80. If BP >130/80 consistently recommend follow-up in clinic.     Alicia Chen PA-C  (Covering for Dr. Garland)

## 2025-06-20 ENCOUNTER — TRANSFERRED RECORDS (OUTPATIENT)
Dept: HEALTH INFORMATION MANAGEMENT | Facility: CLINIC | Age: 65
End: 2025-06-20
Payer: COMMERCIAL

## 2025-06-30 DIAGNOSIS — I10 BENIGN ESSENTIAL HYPERTENSION: ICD-10-CM

## 2025-06-30 RX ORDER — AMLODIPINE BESYLATE 2.5 MG/1
2.5 TABLET ORAL DAILY
Qty: 90 TABLET | Refills: 3 | Status: SHIPPED | OUTPATIENT
Start: 2025-06-30

## 2025-07-23 ENCOUNTER — TRANSFERRED RECORDS (OUTPATIENT)
Dept: HEALTH INFORMATION MANAGEMENT | Facility: CLINIC | Age: 65
End: 2025-07-23
Payer: COMMERCIAL

## 2025-08-21 ENCOUNTER — TRANSFERRED RECORDS (OUTPATIENT)
Dept: HEALTH INFORMATION MANAGEMENT | Facility: CLINIC | Age: 65
End: 2025-08-21
Payer: COMMERCIAL